# Patient Record
Sex: FEMALE | Race: WHITE | NOT HISPANIC OR LATINO | Employment: OTHER | URBAN - METROPOLITAN AREA
[De-identification: names, ages, dates, MRNs, and addresses within clinical notes are randomized per-mention and may not be internally consistent; named-entity substitution may affect disease eponyms.]

---

## 2017-03-13 ENCOUNTER — CONVERSION ENCOUNTER (OUTPATIENT)
Dept: MAMMOGRAPHY | Facility: HOSPITAL | Age: 67
End: 2017-03-13

## 2018-03-15 ENCOUNTER — CONVERSION ENCOUNTER (OUTPATIENT)
Dept: GENERAL RADIOLOGY | Facility: HOSPITAL | Age: 68
End: 2018-03-15

## 2018-03-30 ENCOUNTER — CONVERSION ENCOUNTER (OUTPATIENT)
Dept: GENERAL RADIOLOGY | Facility: HOSPITAL | Age: 68
End: 2018-03-30

## 2019-01-07 ENCOUNTER — HOSPITAL ENCOUNTER (OUTPATIENT)
Dept: LAB | Facility: HOSPITAL | Age: 69
Discharge: HOME | End: 2019-01-07
Attending: INTERNAL MEDICINE

## 2019-01-07 LAB
25(OH)D3 SERPL-MCNC: 31.2 NG/ML (ref 30–100)
ALBUMIN SERPL-MCNC: 4.2 G/DL (ref 3.5–5)
ALBUMIN/GLOB SERPL: 1.7 {RATIO} (ref 1.4–2.6)
ALP SERPL-CCNC: 79 U/L (ref 43–160)
ALT SERPL-CCNC: 27 U/L (ref 10–40)
ANION GAP SERPL CALC-SCNC: 16 MMOL/L (ref 8–19)
AST SERPL-CCNC: 19 U/L (ref 15–50)
BILIRUB SERPL-MCNC: 0.29 MG/DL (ref 0.2–1.3)
BUN SERPL-MCNC: 21 MG/DL (ref 5–25)
BUN/CREAT SERPL: 28 {RATIO} (ref 6–20)
CALCIUM SERPL-MCNC: 9.3 MG/DL (ref 8.7–10.4)
CHLORIDE SERPL-SCNC: 100 MMOL/L (ref 99–111)
CONV CO2: 28 MMOL/L (ref 22–32)
CONV TOTAL PROTEIN: 6.7 G/DL (ref 6.3–8.2)
CREAT UR-MCNC: 0.74 MG/DL (ref 0.5–0.9)
GFR SERPLBLD BASED ON 1.73 SQ M-ARVRAT: >60 ML/MIN/{1.73_M2}
GLOBULIN UR ELPH-MCNC: 2.5 G/DL (ref 2–3.5)
GLUCOSE SERPL-MCNC: 102 MG/DL (ref 65–99)
OSMOLALITY SERPL CALC.SUM OF ELEC: 293 MOSM/KG (ref 273–304)
POTASSIUM SERPL-SCNC: 4.4 MMOL/L (ref 3.5–5.3)
SODIUM SERPL-SCNC: 140 MMOL/L (ref 135–147)

## 2019-01-29 ENCOUNTER — HOSPITAL ENCOUNTER (OUTPATIENT)
Dept: URGENT CARE | Facility: CLINIC | Age: 69
Discharge: HOME | End: 2019-01-29
Attending: FAMILY MEDICINE

## 2019-03-18 ENCOUNTER — HOSPITAL ENCOUNTER (OUTPATIENT)
Dept: GENERAL RADIOLOGY | Facility: HOSPITAL | Age: 69
Discharge: HOME | End: 2019-03-18
Attending: INTERNAL MEDICINE

## 2019-10-14 ENCOUNTER — HOSPITAL ENCOUNTER (OUTPATIENT)
Dept: GENERAL RADIOLOGY | Facility: HOSPITAL | Age: 69
Discharge: HOME | End: 2019-10-14
Attending: INTERNAL MEDICINE

## 2019-10-14 LAB
ALBUMIN SERPL-MCNC: 4.4 G/DL (ref 3.5–5)
ALBUMIN/GLOB SERPL: 1.8 {RATIO} (ref 1.4–2.6)
ALP SERPL-CCNC: 84 U/L (ref 43–160)
ALT SERPL-CCNC: 27 U/L (ref 10–40)
ANION GAP SERPL CALC-SCNC: 17 MMOL/L (ref 8–19)
AST SERPL-CCNC: 25 U/L (ref 15–50)
BASOPHILS # BLD AUTO: 0.05 10*3/UL (ref 0–0.2)
BASOPHILS NFR BLD AUTO: 0.6 % (ref 0–3)
BILIRUB SERPL-MCNC: 0.34 MG/DL (ref 0.2–1.3)
BUN SERPL-MCNC: 17 MG/DL (ref 5–25)
BUN/CREAT SERPL: 22 {RATIO} (ref 6–20)
CALCIUM SERPL-MCNC: 9.5 MG/DL (ref 8.7–10.4)
CHLORIDE SERPL-SCNC: 103 MMOL/L (ref 99–111)
CHOLEST SERPL-MCNC: 190 MG/DL (ref 107–200)
CHOLEST/HDLC SERPL: 4 {RATIO} (ref 3–6)
CONV ABS IMM GRAN: 0.02 10*3/UL (ref 0–0.2)
CONV CO2: 26 MMOL/L (ref 22–32)
CONV IMMATURE GRAN: 0.3 % (ref 0–1.8)
CONV TOTAL PROTEIN: 6.9 G/DL (ref 6.3–8.2)
CREAT UR-MCNC: 0.79 MG/DL (ref 0.5–0.9)
DEPRECATED RDW RBC AUTO: 45.7 FL (ref 36.4–46.3)
EOSINOPHIL # BLD AUTO: 0.21 10*3/UL (ref 0–0.7)
EOSINOPHIL # BLD AUTO: 2.7 % (ref 0–7)
ERYTHROCYTE [DISTWIDTH] IN BLOOD BY AUTOMATED COUNT: 12.8 % (ref 11.7–14.4)
GFR SERPLBLD BASED ON 1.73 SQ M-ARVRAT: >60 ML/MIN/{1.73_M2}
GLOBULIN UR ELPH-MCNC: 2.5 G/DL (ref 2–3.5)
GLUCOSE SERPL-MCNC: 98 MG/DL (ref 65–99)
HCT VFR BLD AUTO: 45.1 % (ref 37–47)
HDLC SERPL-MCNC: 47 MG/DL (ref 40–60)
HGB BLD-MCNC: 14.6 G/DL (ref 12–16)
LDLC SERPL CALC-MCNC: 114 MG/DL (ref 70–100)
LYMPHOCYTES # BLD AUTO: 1.89 10*3/UL (ref 1–5)
LYMPHOCYTES NFR BLD AUTO: 24 % (ref 20–45)
MCH RBC QN AUTO: 31.1 PG (ref 27–31)
MCHC RBC AUTO-ENTMCNC: 32.4 G/DL (ref 33–37)
MCV RBC AUTO: 96 FL (ref 81–99)
MONOCYTES # BLD AUTO: 0.66 10*3/UL (ref 0.2–1.2)
MONOCYTES NFR BLD AUTO: 8.4 % (ref 3–10)
NEUTROPHILS # BLD AUTO: 5.05 10*3/UL (ref 2–8)
NEUTROPHILS NFR BLD AUTO: 64 % (ref 30–85)
NRBC CBCN: 0 % (ref 0–0.7)
OSMOLALITY SERPL CALC.SUM OF ELEC: 294 MOSM/KG (ref 273–304)
PLATELET # BLD AUTO: 268 10*3/UL (ref 130–400)
PMV BLD AUTO: 11.6 FL (ref 9.4–12.3)
POTASSIUM SERPL-SCNC: 4.7 MMOL/L (ref 3.5–5.3)
RBC # BLD AUTO: 4.7 10*6/UL (ref 4.2–5.4)
SODIUM SERPL-SCNC: 141 MMOL/L (ref 135–147)
TRIGL SERPL-MCNC: 145 MG/DL (ref 40–150)
TSH SERPL-ACNC: 2.23 M[IU]/L (ref 0.27–4.2)
VLDLC SERPL-MCNC: 29 MG/DL (ref 5–37)
WBC # BLD AUTO: 7.88 10*3/UL (ref 4.8–10.8)

## 2019-11-14 ENCOUNTER — HOSPITAL ENCOUNTER (OUTPATIENT)
Dept: GASTROENTEROLOGY | Facility: HOSPITAL | Age: 69
Setting detail: HOSPITAL OUTPATIENT SURGERY
Discharge: HOME | End: 2019-11-14
Attending: INTERNAL MEDICINE

## 2020-01-21 ENCOUNTER — OFFICE VISIT CONVERTED (OUTPATIENT)
Dept: GASTROENTEROLOGY | Facility: CLINIC | Age: 70
End: 2020-01-21
Attending: NURSE PRACTITIONER

## 2020-01-21 ENCOUNTER — CONVERSION ENCOUNTER (OUTPATIENT)
Dept: GASTROENTEROLOGY | Facility: CLINIC | Age: 70
End: 2020-01-21

## 2020-06-24 ENCOUNTER — HOSPITAL ENCOUNTER (OUTPATIENT)
Dept: GENERAL RADIOLOGY | Facility: HOSPITAL | Age: 70
Discharge: HOME | End: 2020-06-24
Attending: INTERNAL MEDICINE

## 2020-08-10 ENCOUNTER — HOSPITAL ENCOUNTER (OUTPATIENT)
Dept: LAB | Facility: HOSPITAL | Age: 70
Discharge: HOME | End: 2020-08-10
Attending: INTERNAL MEDICINE

## 2020-08-10 LAB
ALBUMIN SERPL-MCNC: 4.2 G/DL (ref 3.5–5)
ALBUMIN/GLOB SERPL: 1.7 {RATIO} (ref 1.4–2.6)
ALP SERPL-CCNC: 98 U/L (ref 43–160)
ALT SERPL-CCNC: 50 U/L (ref 10–40)
ANION GAP SERPL CALC-SCNC: 18 MMOL/L (ref 8–19)
AST SERPL-CCNC: 44 U/L (ref 15–50)
BASOPHILS # BLD AUTO: 0.05 10*3/UL (ref 0–0.2)
BASOPHILS NFR BLD AUTO: 0.7 % (ref 0–3)
BILIRUB SERPL-MCNC: 0.32 MG/DL (ref 0.2–1.3)
BUN SERPL-MCNC: 19 MG/DL (ref 5–25)
BUN/CREAT SERPL: 21 {RATIO} (ref 6–20)
CALCIUM SERPL-MCNC: 9.8 MG/DL (ref 8.7–10.4)
CHLORIDE SERPL-SCNC: 102 MMOL/L (ref 99–111)
CHOLEST SERPL-MCNC: 210 MG/DL (ref 107–200)
CHOLEST/HDLC SERPL: 5.1 {RATIO} (ref 3–6)
CONV ABS IMM GRAN: 0.03 10*3/UL (ref 0–0.2)
CONV CO2: 24 MMOL/L (ref 22–32)
CONV IMMATURE GRAN: 0.4 % (ref 0–1.8)
CONV TOTAL PROTEIN: 6.7 G/DL (ref 6.3–8.2)
CREAT UR-MCNC: 0.9 MG/DL (ref 0.5–0.9)
DEPRECATED RDW RBC AUTO: 45.5 FL (ref 36.4–46.3)
EOSINOPHIL # BLD AUTO: 0.2 10*3/UL (ref 0–0.7)
EOSINOPHIL # BLD AUTO: 2.9 % (ref 0–7)
ERYTHROCYTE [DISTWIDTH] IN BLOOD BY AUTOMATED COUNT: 13.1 % (ref 11.7–14.4)
EST. AVERAGE GLUCOSE BLD GHB EST-MCNC: 114 MG/DL
GFR SERPLBLD BASED ON 1.73 SQ M-ARVRAT: >60 ML/MIN/{1.73_M2}
GLOBULIN UR ELPH-MCNC: 2.5 G/DL (ref 2–3.5)
GLUCOSE SERPL-MCNC: 109 MG/DL (ref 65–99)
HBA1C MFR BLD: 5.6 % (ref 3.5–5.7)
HCT VFR BLD AUTO: 43.9 % (ref 37–47)
HDLC SERPL-MCNC: 41 MG/DL (ref 40–60)
HGB BLD-MCNC: 14.2 G/DL (ref 12–16)
LDLC SERPL CALC-MCNC: 145 MG/DL (ref 70–100)
LYMPHOCYTES # BLD AUTO: 1.61 10*3/UL (ref 1–5)
LYMPHOCYTES NFR BLD AUTO: 23.1 % (ref 20–45)
MCH RBC QN AUTO: 31 PG (ref 27–31)
MCHC RBC AUTO-ENTMCNC: 32.3 G/DL (ref 33–37)
MCV RBC AUTO: 95.9 FL (ref 81–99)
MONOCYTES # BLD AUTO: 0.71 10*3/UL (ref 0.2–1.2)
MONOCYTES NFR BLD AUTO: 10.2 % (ref 3–10)
NEUTROPHILS # BLD AUTO: 4.36 10*3/UL (ref 2–8)
NEUTROPHILS NFR BLD AUTO: 62.7 % (ref 30–85)
NRBC CBCN: 0 % (ref 0–0.7)
OSMOLALITY SERPL CALC.SUM OF ELEC: 293 MOSM/KG (ref 273–304)
PLATELET # BLD AUTO: 256 10*3/UL (ref 130–400)
PMV BLD AUTO: 11.8 FL (ref 9.4–12.3)
POTASSIUM SERPL-SCNC: 4.4 MMOL/L (ref 3.5–5.3)
RBC # BLD AUTO: 4.58 10*6/UL (ref 4.2–5.4)
SODIUM SERPL-SCNC: 140 MMOL/L (ref 135–147)
TRIGL SERPL-MCNC: 121 MG/DL (ref 40–150)
TSH SERPL-ACNC: 2.55 M[IU]/L (ref 0.27–4.2)
VLDLC SERPL-MCNC: 24 MG/DL (ref 5–37)
WBC # BLD AUTO: 6.96 10*3/UL (ref 4.8–10.8)

## 2021-05-07 ENCOUNTER — HOSPITAL ENCOUNTER (OUTPATIENT)
Dept: GENERAL RADIOLOGY | Facility: HOSPITAL | Age: 71
Discharge: HOME | End: 2021-05-07
Attending: INTERNAL MEDICINE

## 2021-05-07 LAB
ALBUMIN SERPL-MCNC: 4.3 G/DL (ref 3.5–5)
ALBUMIN/GLOB SERPL: 1.3 {RATIO} (ref 1.4–2.6)
ALP SERPL-CCNC: 112 U/L (ref 43–160)
ALT SERPL-CCNC: 47 U/L (ref 10–40)
AMPHETAMINES UR QL SCN: NEGATIVE
ANION GAP SERPL CALC-SCNC: 18 MMOL/L (ref 8–19)
AST SERPL-CCNC: 32 U/L (ref 15–50)
BARBITURATES UR QL SCN: NEGATIVE
BENZODIAZ UR QL SCN: NEGATIVE
BILIRUB SERPL-MCNC: 0.42 MG/DL (ref 0.2–1.3)
BUN SERPL-MCNC: 14 MG/DL (ref 5–25)
BUN/CREAT SERPL: 16 {RATIO} (ref 6–20)
CALCIUM SERPL-MCNC: 9.6 MG/DL (ref 8.7–10.4)
CHLORIDE SERPL-SCNC: 101 MMOL/L (ref 99–111)
CHOLEST SERPL-MCNC: 220 MG/DL (ref 107–200)
CHOLEST/HDLC SERPL: 4.7 {RATIO} (ref 3–6)
CONV CO2: 27 MMOL/L (ref 22–32)
CONV COCAINE, UR: NEGATIVE
CONV TOTAL PROTEIN: 7.7 G/DL (ref 6.3–8.2)
CREAT UR-MCNC: 0.88 MG/DL (ref 0.5–0.9)
EST. AVERAGE GLUCOSE BLD GHB EST-MCNC: 123 MG/DL
GFR SERPLBLD BASED ON 1.73 SQ M-ARVRAT: >60 ML/MIN/{1.73_M2}
GLOBULIN UR ELPH-MCNC: 3.4 G/DL (ref 2–3.5)
GLUCOSE SERPL-MCNC: 108 MG/DL (ref 65–99)
HBA1C MFR BLD: 5.9 % (ref 3.5–5.7)
HDLC SERPL-MCNC: 47 MG/DL (ref 40–60)
LDLC SERPL CALC-MCNC: 141 MG/DL (ref 70–100)
METHADONE UR QL SCN: NEGATIVE
OPIATES TESTED UR SCN: NEGATIVE
OSMOLALITY SERPL CALC.SUM OF ELEC: 295 MOSM/KG (ref 273–304)
OXYCODONE UR QL SCN: NEGATIVE
PCP UR QL: NEGATIVE
POTASSIUM SERPL-SCNC: 4.2 MMOL/L (ref 3.5–5.3)
SODIUM SERPL-SCNC: 142 MMOL/L (ref 135–147)
THC SERPLBLD CFM-MCNC: NEGATIVE NG/ML
TRIGL SERPL-MCNC: 161 MG/DL (ref 40–150)
VLDLC SERPL-MCNC: 32 MG/DL (ref 5–37)

## 2021-05-15 VITALS
DIASTOLIC BLOOD PRESSURE: 81 MMHG | HEIGHT: 64 IN | HEART RATE: 79 BPM | WEIGHT: 182.5 LBS | SYSTOLIC BLOOD PRESSURE: 122 MMHG | BODY MASS INDEX: 31.16 KG/M2

## 2021-05-22 ENCOUNTER — TRANSCRIBE ORDERS (OUTPATIENT)
Dept: ADMINISTRATIVE | Facility: HOSPITAL | Age: 71
End: 2021-05-22

## 2021-05-22 DIAGNOSIS — Z12.31 VISIT FOR SCREENING MAMMOGRAM: Primary | ICD-10-CM

## 2021-06-29 ENCOUNTER — HOSPITAL ENCOUNTER (OUTPATIENT)
Dept: MAMMOGRAPHY | Facility: HOSPITAL | Age: 71
Discharge: HOME | End: 2021-06-29
Admitting: INTERNAL MEDICINE

## 2021-06-29 DIAGNOSIS — Z12.31 VISIT FOR SCREENING MAMMOGRAM: ICD-10-CM

## 2021-06-29 PROCEDURE — 77067 SCR MAMMO BI INCL CAD: CPT

## 2021-06-29 PROCEDURE — 77063 BREAST TOMOSYNTHESIS BI: CPT

## 2021-06-29 PROCEDURE — 77063 BREAST TOMOSYNTHESIS BI: CPT | Performed by: RADIOLOGY

## 2021-06-29 PROCEDURE — 77067 SCR MAMMO BI INCL CAD: CPT | Performed by: RADIOLOGY

## 2021-07-15 RX ORDER — TRIAMTERENE AND HYDROCHLOROTHIAZIDE 37.5; 25 MG/1; MG/1
CAPSULE ORAL
Qty: 90 CAPSULE | Refills: 3 | Status: SHIPPED | OUTPATIENT
Start: 2021-07-15 | End: 2022-01-25 | Stop reason: SDUPTHER

## 2021-09-13 ENCOUNTER — OFFICE VISIT (OUTPATIENT)
Dept: PODIATRY | Facility: CLINIC | Age: 71
End: 2021-09-13

## 2021-09-13 VITALS
HEART RATE: 74 BPM | BODY MASS INDEX: 32.32 KG/M2 | OXYGEN SATURATION: 98 % | SYSTOLIC BLOOD PRESSURE: 143 MMHG | WEIGHT: 194 LBS | TEMPERATURE: 96.7 F | DIASTOLIC BLOOD PRESSURE: 71 MMHG | HEIGHT: 65 IN

## 2021-09-13 DIAGNOSIS — M72.2 PLANTAR FASCIITIS: ICD-10-CM

## 2021-09-13 DIAGNOSIS — M79.672 FOOT PAIN, LEFT: Primary | ICD-10-CM

## 2021-09-13 PROCEDURE — 73630 X-RAY EXAM OF FOOT: CPT | Performed by: PODIATRIST

## 2021-09-13 PROCEDURE — 99203 OFFICE O/P NEW LOW 30 MIN: CPT | Performed by: PODIATRIST

## 2021-09-13 RX ORDER — METHYLPREDNISOLONE 4 MG/1
TABLET ORAL
Qty: 21 TABLET | Refills: 0 | Status: SHIPPED | OUTPATIENT
Start: 2021-09-13 | End: 2021-10-17 | Stop reason: SDUPTHER

## 2021-09-13 RX ORDER — DICLOFENAC SODIUM 75 MG/1
75 TABLET, DELAYED RELEASE ORAL 2 TIMES DAILY
Qty: 60 TABLET | Refills: 1 | Status: SHIPPED | OUTPATIENT
Start: 2021-09-13 | End: 2021-10-13

## 2021-09-13 RX ORDER — TRIAMTERENE AND HYDROCHLOROTHIAZIDE 37.5; 25 MG/1; MG/1
CAPSULE ORAL
COMMUNITY
Start: 2018-06-28 | End: 2021-10-17 | Stop reason: SDUPTHER

## 2021-09-13 RX ORDER — CITALOPRAM 20 MG/1
TABLET ORAL
COMMUNITY
Start: 2018-06-28 | End: 2022-01-11

## 2021-09-13 RX ORDER — MELATONIN
COMMUNITY
Start: 2018-06-28

## 2021-09-13 RX ORDER — GABAPENTIN 100 MG/1
CAPSULE ORAL
COMMUNITY
Start: 2021-08-11 | End: 2021-09-20

## 2021-09-13 NOTE — PROGRESS NOTES
[facility] - PODIATRY    Today's Date: 09/13/21    Patient Name: Alison Mackenzie  MRN: [id number]  CSN: [id number]  PCP: Watson Johansen MD  Referring Provider: No ref. provider found    SUBJECTIVE     Chief Complaint   Patient presents with   • Left Foot - Pain     Heel pain which is much better since she scheduled this appointment     HPI: Alison Mackenzie, a 70 y.o.female, comes to clinic.    New, Established, New Problem: New    Location: Left heel    Duration: Early summer 2021    Onset:  gradual    Nature:  achy, sharp, shooting    Stable, worsening, improving: Slowly improving    Aggravating factors:      Patient describes morning left heel pain as stabbing, burning, or aching. This pain usually subsides throughout the day, however it returns after periods of rest and sitting, when standing back up   on their feet, and again the next morning.      Previous Treatment: Change in shoes    Patient denies any fevers, chills, nausea, vomiting, shortness of breathe, nor any other constitutional signs nor symptoms.    No other pedal complaints at this time.    Past Medical History:   Diagnosis Date   • Depression    • Middle ear effusion, bilateral      Past Surgical History:   Procedure Laterality Date   • COLONOSCOPY      15+ years ago   • CYSTOSCOPY     • HYSTERECTOMY       Family History   Problem Relation Age of Onset   • Kidney cancer Father 70     Social History     Socioeconomic History   • Marital status:      Spouse name: Not on file   • Number of children: Not on file   • Years of education: Not on file   • Highest education level: Not on file   Tobacco Use   • Smoking status: Never Smoker   • Smokeless tobacco: Never Used   Vaping Use   • Vaping Use: Never used   Substance and Sexual Activity   • Alcohol use: Never   • Drug use: Never   • Sexual activity: Defer     No Known Allergies  Current Outpatient Medications   Medication Sig Dispense Refill   •  cholecalciferol (Vitamin D) 25 MCG (1000 UT) tablet      • citalopram (CeleXA) 20 MG tablet Celexa 20 mg oral tablet take 1 tablet (20 mg) by oral route once daily   Active     • diclofenac (VOLTAREN) 75 MG EC tablet Take 1 tablet by mouth 2 (Two) Times a Day for 30 days. 60 tablet 1   • gabapentin (NEURONTIN) 100 MG capsule      • methylPREDNISolone (MEDROL) 4 MG dose pack Take as directed 21 tablet 0   • triamterene-hydrochlorothiazide (DYAZIDE) 37.5-25 MG per capsule TAKE 1 CAPSULE DAILY 90 capsule 3   • triamterene-hydrochlorothiazide (Dyazide) 37.5-25 MG per capsule        No current facility-administered medications for this visit.     Review of Systems   Constitutional: Negative.    Musculoskeletal:        Left heel pain   All other systems reviewed and are negative.      OBJECTIVE     Vitals:    09/13/21 0857   BP: 143/71   Pulse: 74   Temp: 96.7 °F (35.9 °C)   SpO2: 98%       PHYSICAL EXAM     Foot/Ankle Exam:       General:   Appearance: appears stated age and healthy    Orientation: AAOx3    Affect: appropriate    Gait: unimpaired      VASCULAR      Right Foot Vascularity   Normal vascular exam    Dorsalis pedis:  2+  Posterior tibial:  2+  Skin Temperature: warm    Edema Grading:  None  CFT:  < 3 seconds  Pedal Hair Growth:  Present  Varicosities: none       Left Foot Vascularity   Normal vascular exam    Dorsalis pedis:  2+  Posterior tibial:  2+  Skin Temperature: warm    Edema Grading:  None  CFT:  < 3 seconds  Pedal Hair Growth:  Present  Varicosities: none        NEUROLOGIC     Right Foot Neurologic   Normal sensation    Light touch sensation:  Normal  Vibratory sensation:  Normal  Hot/Cold sensation: normal       Left Foot Neurologic   Normal sensation    Light touch sensation:  Normal  Vibratory sensation:  Normal  Hot/cold sensation: normal    Protective Sensation using L[place]-Linda Monofilament:  10     MUSCULOSKELETAL      Left Foot Musculoskeletal   Tenderness: plantar fascia and plantar  heel       MUSCLE STRENGTH     Right Foot Muscle Strength   Normal strength    Foot dorsiflexion:  5  Foot plantar flexion:  5  Foot inversion:  5  Foot eversion:  5     Left Foot Muscle Strength   Foot dorsiflexion:  5  Foot plantar flexion:  5  Foot inversion:  5  Foot eversion:  5     RANGE OF MOTION      Right Foot Range of Motion   Foot and ankle ROM within normal limits       Left Foot Range of Motion   Foot and ankle ROM within normal limits       DERMATOLOGIC     Right Foot Dermatologic   Skin: skin intact    Nails: normal       Left Foot Dermatologic   Skin: skin intact    Nails: normal        RADIOLOGY:    XR Foot 3+ View Left    Result Date: 9/13/2021  Narrative: IN-OFFICE IMAGING:  3 view, AP, MO, Lateral, left foot Indication: Left heel pain Findings: Small posterior and inferior calcaneal enthesopathy.  No periosteal reactions nor osteolytic changes seen.  No occult fractures seen. Comparison: No comparison views available.        ASSESSMENT/PLAN     Diagnoses and all orders for this visit:    1. Foot pain, left (Primary)    2. Plantar fasciitis  -     diclofenac (VOLTAREN) 75 MG EC tablet; Take 1 tablet by mouth 2 (Two) Times a Day for 30 days.  Dispense: 60 tablet; Refill: 1  -     methylPREDNISolone (MEDROL) 4 MG dose pack; Take as directed  Dispense: 21 tablet; Refill: 0      Comprehensive lower extremity examination and evaluation was performed.    Discussed findings and treatment plan including risks, benefits, and treatment options with patient in detail. Patient agreed with treatment plan.    An After Visit Summary was printed and given to the patient at discharge, including (if requested) any available informative/educational handouts regarding diagnosis, treatment, or medications. All questions were answered to patient/family satisfaction. Should symptoms fail to improve or worsen they agree to call or return to clinic or to go to the Emergency Department. Discussed the importance of  following up with any needed screening tests/labs/specialist appointments and any requested follow-up recommended by me today. Importance of maintaining follow-up discussed and patient accepts that missed appointments can delay diagnosis and potentially lead to worsening of conditions.    Return in about 3 weeks (around 10/4/2021) for Rx f/u., or sooner if acute issues arise.    This document has been electronically signed by Jori Juarez DPM on September 13, 2021 11:49 EDT

## 2021-09-18 DIAGNOSIS — G62.9 PERIPHERAL POLYNEUROPATHY: Primary | ICD-10-CM

## 2021-09-20 RX ORDER — GABAPENTIN 100 MG/1
CAPSULE ORAL
Qty: 60 CAPSULE | Refills: 5 | Status: SHIPPED | OUTPATIENT
Start: 2021-09-20 | End: 2022-02-09 | Stop reason: SDUPTHER

## 2021-09-20 RX ORDER — GABAPENTIN 300 MG/1
CAPSULE ORAL
Qty: 30 CAPSULE | Refills: 5 | Status: SHIPPED | OUTPATIENT
Start: 2021-09-20 | End: 2022-02-09

## 2022-01-11 RX ORDER — CITALOPRAM 20 MG/1
TABLET ORAL
Qty: 90 TABLET | Refills: 3 | Status: SHIPPED | OUTPATIENT
Start: 2022-01-11 | End: 2022-01-25 | Stop reason: SDUPTHER

## 2022-01-25 RX ORDER — CITALOPRAM 20 MG/1
20 TABLET ORAL DAILY
Qty: 90 TABLET | Refills: 3 | Status: SHIPPED | OUTPATIENT
Start: 2022-01-25 | End: 2023-01-18

## 2022-01-25 RX ORDER — TRIAMTERENE AND HYDROCHLOROTHIAZIDE 37.5; 25 MG/1; MG/1
1 CAPSULE ORAL DAILY
Qty: 90 CAPSULE | Refills: 3 | Status: SHIPPED | OUTPATIENT
Start: 2022-01-25 | End: 2023-01-18

## 2022-01-25 NOTE — TELEPHONE ENCOUNTER
Caller: Alison Mackenzie    Relationship: Self    Best call back number: [phone]    Requested Prescriptions:   Requested Prescriptions     Pending Prescriptions Disp Refills   • triamterene-hydrochlorothiazide (DYAZIDE) 37.5-25 MG per capsule 90 capsule 3     Sig: Take 1 capsule by mouth Daily.   • citalopram (CeleXA) 20 MG tablet 90 tablet 3     Sig: Take 1 tablet by mouth Daily.        Pharmacy where request should be sent: [id number] [place] [facility] [address] [facility] [phone] [facility] [phone] FX     Additional details provided by patient:  PATIENT CALLED STATING THAT SHE HER INSURANCE HAS CHANGED FOR HER MEDICATIONS AND SHE IS NEEDING HER   gabapentin (NEURONTIN) 300 MG capsule    gabapentin (NEURONTIN) 100 MG capsule    TO BE SENT TO [facility]. THE PATIENT WOULD LIKE A CALL BACK TO LET HER KNOW THIS HAS BEEN SENT TO THE PHARMACY PLEASE ADVISE THANK YOU.     [facility]   [address] [place] [address] [place] [address] [phone]    Does the patient have less than a 3 day supply:  [x] Yes  [x] No    Fredi Van Rep   01/25/22 14:48 EST

## 2022-02-07 ENCOUNTER — TRANSCRIBE ORDERS (OUTPATIENT)
Dept: LAB | Facility: HOSPITAL | Age: 72
End: 2022-02-07

## 2022-02-07 ENCOUNTER — LAB (OUTPATIENT)
Dept: LAB | Facility: HOSPITAL | Age: 72
End: 2022-02-07

## 2022-02-07 DIAGNOSIS — Z79.899 ENCOUNTER FOR LONG-TERM (CURRENT) USE OF OTHER MEDICATIONS: ICD-10-CM

## 2022-02-07 DIAGNOSIS — E78.2 MIXED HYPERLIPIDEMIA: ICD-10-CM

## 2022-02-07 DIAGNOSIS — E55.9 AVITAMINOSIS D: ICD-10-CM

## 2022-02-07 DIAGNOSIS — R73.01 IMPAIRED FASTING GLUCOSE: Primary | ICD-10-CM

## 2022-02-07 DIAGNOSIS — R73.01 IMPAIRED FASTING GLUCOSE: ICD-10-CM

## 2022-02-07 LAB
25(OH)D3 SERPL-MCNC: 57.9 NG/ML
ALBUMIN SERPL-MCNC: 4 G/DL (ref 3.5–5.2)
ALBUMIN/GLOB SERPL: 1.5 G/DL
ALP SERPL-CCNC: 81 U/L (ref 39–117)
ALT SERPL W P-5'-P-CCNC: 27 U/L (ref 1–33)
AMPHET+METHAMPHET UR QL: NEGATIVE
ANION GAP SERPL CALCULATED.3IONS-SCNC: 8.4 MMOL/L (ref 5–15)
AST SERPL-CCNC: 20 U/L (ref 1–32)
BARBITURATES UR QL SCN: NEGATIVE
BASOPHILS # BLD AUTO: 0.06 10*3/MM3 (ref 0–0.2)
BASOPHILS NFR BLD AUTO: 0.7 % (ref 0–1.5)
BENZODIAZ UR QL SCN: NEGATIVE
BILIRUB SERPL-MCNC: 0.3 MG/DL (ref 0–1.2)
BUN SERPL-MCNC: 19 MG/DL (ref 8–23)
BUN/CREAT SERPL: 21.1 (ref 7–25)
CALCIUM SPEC-SCNC: 9.5 MG/DL (ref 8.6–10.5)
CANNABINOIDS SERPL QL: NEGATIVE
CHLORIDE SERPL-SCNC: 103 MMOL/L (ref 98–107)
CHOLEST SERPL-MCNC: 211 MG/DL (ref 0–200)
CO2 SERPL-SCNC: 28.6 MMOL/L (ref 22–29)
COCAINE UR QL: NEGATIVE
CREAT SERPL-MCNC: 0.9 MG/DL (ref 0.57–1)
DEPRECATED RDW RBC AUTO: 41.8 FL (ref 37–54)
EOSINOPHIL # BLD AUTO: 0.24 10*3/MM3 (ref 0–0.4)
EOSINOPHIL NFR BLD AUTO: 2.6 % (ref 0.3–6.2)
ERYTHROCYTE [DISTWIDTH] IN BLOOD BY AUTOMATED COUNT: 12.3 % (ref 12.3–15.4)
GFR SERPL CREATININE-BSD FRML MDRD: 62 ML/MIN/1.73
GLOBULIN UR ELPH-MCNC: 2.6 GM/DL
GLUCOSE SERPL-MCNC: 93 MG/DL (ref 65–99)
HBA1C MFR BLD: 6.39 % (ref 4.8–5.6)
HCT VFR BLD AUTO: 45.5 % (ref 34–46.6)
HDLC SERPL-MCNC: 46 MG/DL (ref 40–60)
HGB BLD-MCNC: 15.1 G/DL (ref 12–15.9)
IMM GRANULOCYTES # BLD AUTO: 0.04 10*3/MM3 (ref 0–0.05)
IMM GRANULOCYTES NFR BLD AUTO: 0.4 % (ref 0–0.5)
LDLC SERPL CALC-MCNC: 138 MG/DL (ref 0–100)
LDLC/HDLC SERPL: 2.92 {RATIO}
LYMPHOCYTES # BLD AUTO: 2.14 10*3/MM3 (ref 0.7–3.1)
LYMPHOCYTES NFR BLD AUTO: 23.3 % (ref 19.6–45.3)
MCH RBC QN AUTO: 30.9 PG (ref 26.6–33)
MCHC RBC AUTO-ENTMCNC: 33.2 G/DL (ref 31.5–35.7)
MCV RBC AUTO: 93.2 FL (ref 79–97)
METHADONE UR QL SCN: NEGATIVE
MONOCYTES # BLD AUTO: 0.81 10*3/MM3 (ref 0.1–0.9)
MONOCYTES NFR BLD AUTO: 8.8 % (ref 5–12)
NEUTROPHILS NFR BLD AUTO: 5.89 10*3/MM3 (ref 1.7–7)
NEUTROPHILS NFR BLD AUTO: 64.2 % (ref 42.7–76)
NRBC BLD AUTO-RTO: 0 /100 WBC (ref 0–0.2)
OPIATES UR QL: NEGATIVE
OXYCODONE UR QL SCN: NEGATIVE
PLATELET # BLD AUTO: 292 10*3/MM3 (ref 140–450)
PMV BLD AUTO: 11.3 FL (ref 6–12)
POTASSIUM SERPL-SCNC: 4.4 MMOL/L (ref 3.5–5.2)
PROT SERPL-MCNC: 6.6 G/DL (ref 6–8.5)
RBC # BLD AUTO: 4.88 10*6/MM3 (ref 3.77–5.28)
SODIUM SERPL-SCNC: 140 MMOL/L (ref 136–145)
TRIGL SERPL-MCNC: 153 MG/DL (ref 0–150)
VLDLC SERPL-MCNC: 27 MG/DL (ref 5–40)
WBC NRBC COR # BLD: 9.18 10*3/MM3 (ref 3.4–10.8)

## 2022-02-07 PROCEDURE — 36415 COLL VENOUS BLD VENIPUNCTURE: CPT

## 2022-02-07 PROCEDURE — 80307 DRUG TEST PRSMV CHEM ANLYZR: CPT

## 2022-02-07 PROCEDURE — 80061 LIPID PANEL: CPT

## 2022-02-07 PROCEDURE — 85025 COMPLETE CBC W/AUTO DIFF WBC: CPT

## 2022-02-07 PROCEDURE — 80053 COMPREHEN METABOLIC PANEL: CPT

## 2022-02-07 PROCEDURE — 82306 VITAMIN D 25 HYDROXY: CPT

## 2022-02-07 PROCEDURE — 83036 HEMOGLOBIN GLYCOSYLATED A1C: CPT

## 2022-02-09 ENCOUNTER — OFFICE VISIT (OUTPATIENT)
Dept: INTERNAL MEDICINE | Facility: CLINIC | Age: 72
End: 2022-02-09

## 2022-02-09 VITALS
TEMPERATURE: 97.7 F | DIASTOLIC BLOOD PRESSURE: 79 MMHG | WEIGHT: 190.4 LBS | HEART RATE: 86 BPM | HEIGHT: 65 IN | SYSTOLIC BLOOD PRESSURE: 124 MMHG | BODY MASS INDEX: 31.72 KG/M2 | OXYGEN SATURATION: 94 %

## 2022-02-09 DIAGNOSIS — E78.2 MIXED HYPERLIPIDEMIA: ICD-10-CM

## 2022-02-09 DIAGNOSIS — Z99.89 OSA ON CPAP: ICD-10-CM

## 2022-02-09 DIAGNOSIS — E55.9 VITAMIN D DEFICIENCY: ICD-10-CM

## 2022-02-09 DIAGNOSIS — H81.01 MENIERE DISEASE, RIGHT: ICD-10-CM

## 2022-02-09 DIAGNOSIS — R74.8 ELEVATED LIVER ENZYMES: ICD-10-CM

## 2022-02-09 DIAGNOSIS — G62.9 PERIPHERAL POLYNEUROPATHY: ICD-10-CM

## 2022-02-09 DIAGNOSIS — G47.33 OSA ON CPAP: ICD-10-CM

## 2022-02-09 DIAGNOSIS — R73.01 IFG (IMPAIRED FASTING GLUCOSE): Primary | ICD-10-CM

## 2022-02-09 DIAGNOSIS — M81.0 OSTEOPOROSIS, UNSPECIFIED OSTEOPOROSIS TYPE, UNSPECIFIED PATHOLOGICAL FRACTURE PRESENCE: ICD-10-CM

## 2022-02-09 DIAGNOSIS — H93.13 TINNITUS OF BOTH EARS: ICD-10-CM

## 2022-02-09 PROBLEM — G47.30 SLEEP APNEA: Status: ACTIVE | Noted: 2022-02-09

## 2022-02-09 PROBLEM — H65.93 MIDDLE EAR EFFUSION, BILATERAL: Status: ACTIVE | Noted: 2022-02-09

## 2022-02-09 PROCEDURE — 99214 OFFICE O/P EST MOD 30 MIN: CPT | Performed by: INTERNAL MEDICINE

## 2022-02-09 RX ORDER — GABAPENTIN 100 MG/1
100 CAPSULE ORAL 2 TIMES DAILY
Qty: 180 CAPSULE | Refills: 3 | Status: SHIPPED | OUTPATIENT
Start: 2022-02-09 | End: 2022-08-22

## 2022-02-09 RX ORDER — GABAPENTIN 300 MG/1
300 CAPSULE ORAL
Qty: 90 CAPSULE | Refills: 3 | Status: SHIPPED | OUTPATIENT
Start: 2022-02-09 | End: 2022-08-22

## 2022-02-09 NOTE — PROGRESS NOTES
"Chief Complaint/ HPI: fatiigue  No covid   No falls   No cp no soa    Objective   Vital Signs  Vitals:    02/09/22 1150   BP: 124/79   BP Location: Left arm   Patient Position: Sitting   Cuff Size: Large Adult   Pulse: 86   Temp: 97.7 °F (36.5 °C)   SpO2: 94%   Weight: 86.4 kg (190 lb 6.4 oz)   Height: 165.1 cm (65\")      Body mass index is 31.68 kg/m².  Review of Systems   Constitutional: Negative.    HENT: Negative.    Eyes: Negative.    Respiratory: Negative.    Cardiovascular: Negative.    Gastrointestinal: Negative.    Endocrine: Negative.    Genitourinary: Negative.    Musculoskeletal: Negative.    Allergic/Immunologic: Negative.    Neurological: Negative.    Hematological: Negative.    Psychiatric/Behavioral: Negative.       Physical Exam  Constitutional:       General: She is not in acute distress.     Appearance: Normal appearance.   HENT:      Head: Normocephalic.      Right Ear: Tympanic membrane and ear canal normal.      Left Ear: Tympanic membrane and ear canal normal.      Mouth/Throat:      Mouth: Mucous membranes are moist.   Eyes:      Conjunctiva/sclera: Conjunctivae normal.      Pupils: Pupils are equal, round, and reactive to light.   Cardiovascular:      Rate and Rhythm: Normal rate and regular rhythm.      Pulses: Normal pulses.      Heart sounds: Normal heart sounds.   Pulmonary:      Effort: Pulmonary effort is normal.      Breath sounds: Normal breath sounds.   Abdominal:      General: Abdomen is flat. Bowel sounds are normal.      Palpations: Abdomen is soft.   Musculoskeletal:         General: No swelling. Normal range of motion.      Cervical back: Neck supple.   Skin:     General: Skin is warm and dry.      Coloration: Skin is not jaundiced.   Neurological:      General: No focal deficit present.      Mental Status: She is alert and oriented to person, place, and time. Mental status is at baseline.   Psychiatric:         Mood and Affect: Mood normal.         Behavior: Behavior normal.   "       Thought Content: Thought content normal.         Judgment: Judgment normal.        Result Review :   No results found for: PROBNP, BNP  CMP    CMP 5/7/21 2/7/22   Glucose 108 (A) 93   BUN 14 19   Creatinine 0.88 0.90   eGFR Non African Am  62   Sodium 142 140   Potassium 4.2 4.4   Chloride 101 103   Calcium 9.6 9.5   Albumin 4.3 4.00   Total Bilirubin 0.42 0.3   Alkaline Phosphatase 112 81   AST (SGOT) 32 20   ALT (SGPT) 47 (A) 27   (A) Abnormal value            CBC w/diff    CBC w/Diff 2/7/22   WBC 9.18   RBC 4.88   Hemoglobin 15.1   Hematocrit 45.5   MCV 93.2   MCH 30.9   MCHC 33.2   RDW 12.3   Platelets 292   Neutrophil Rel % 64.2   Immature Granulocyte Rel % 0.4   Lymphocyte Rel % 23.3   Monocyte Rel % 8.8   Eosinophil Rel % 2.6   Basophil Rel % 0.7            Lipid Panel    Lipid Panel 5/7/21 2/7/22   Total Cholesterol  211 (A)   Total Cholesterol 220 (A)    Triglycerides 161 (A) 153 (A)   HDL Cholesterol 47 46   VLDL Cholesterol 32 27   LDL Cholesterol  141 (A) 138 (A)   LDL/HDL Ratio  2.92   (A) Abnormal value       Comments are available for some flowsheets but are not being displayed.            Lab Results   Component Value Date    TSH 2.550 08/10/2020    TSH 2.230 10/14/2019      No results found for: FREET4   A1C Last 3 Results    HGBA1C Last 3 Results 5/7/21 2/7/22   Hemoglobin A1C 5.9 (A) 6.39 (A)   (A) Abnormal value       Comments are available for some flowsheets but are not being displayed.                             Visit Diagnoses:    ICD-10-CM ICD-9-CM   1. IFG (impaired fasting glucose)  R73.01 790.21   2. Mixed hyperlipidemia  E78.2 272.2   3. Tinnitus of both ears  H93.13 388.30   4. Vitamin D deficiency  E55.9 268.9   5. Elevated liver enzymes  R74.8 790.5   6. Meniere disease, right  H81.01 386.00   7. INDIRA on CPAP  G47.33 327.23    Z99.89 V46.8   8. Osteoporosis, unspecified osteoporosis type, unspecified pathological fracture presence  M81.0 733.00       Assessment and Plan    Diagnoses and all orders for this visit:    1. IFG (impaired fasting glucose) (Primary)  -     Hemoglobin A1c; Future  -     Lipid Panel; Future  -     Comprehensive Metabolic Panel; Future  -     DEXA Bone Density Axial; Future    2. Mixed hyperlipidemia  -     Hemoglobin A1c; Future  -     Lipid Panel; Future  -     Comprehensive Metabolic Panel; Future  -     DEXA Bone Density Axial; Future    3. Tinnitus of both ears  -     Hemoglobin A1c; Future  -     Lipid Panel; Future  -     Comprehensive Metabolic Panel; Future  -     DEXA Bone Density Axial; Future    4. Vitamin D deficiency  -     Hemoglobin A1c; Future  -     Lipid Panel; Future  -     Comprehensive Metabolic Panel; Future  -     DEXA Bone Density Axial; Future    5. Elevated liver enzymes  -     Hemoglobin A1c; Future  -     Lipid Panel; Future  -     Comprehensive Metabolic Panel; Future  -     DEXA Bone Density Axial; Future    6. Meniere disease, right  -     Hemoglobin A1c; Future  -     Lipid Panel; Future  -     Comprehensive Metabolic Panel; Future  -     DEXA Bone Density Axial; Future    7. INDIRA on CPAP  -     Hemoglobin A1c; Future  -     Lipid Panel; Future  -     Comprehensive Metabolic Panel; Future  -     DEXA Bone Density Axial; Future    8. Osteoporosis, unspecified osteoporosis type, unspecified pathological fracture presence  -     Hemoglobin A1c; Future  -     Lipid Panel; Future  -     Comprehensive Metabolic Panel; Future  -     DEXA Bone Density Axial; Future        Mixed hyperlipidemia mild, treatment options,    Impaired fasting glucose hemoglobin A1c up to 6.3, diagnosis discussed,    Obstructive sleep apnea continues on CPAP machine, previous sleep study November 21, 2019 tolerating well,    Ménière syndrome, continues Dyazide,--continues on gabapentin 300 nightly 100 twice daily, discussion about stopping tapering discussed February 2022    History of elevated liver enzymes in the past, previous work-up March 2018 all  negative ultrasound the liver showed some fatty replacement    Vitamin D deficiency previously    Colonoscopy polyps present  Began in November 2019 follow-up again in 2022      Follow Up   No follow-ups on file.  Patient was given instructions and counseling regarding her condition or for health maintenance advice. Please see specific information pulled into the AVS if appropriate.

## 2022-02-10 ENCOUNTER — TRANSCRIBE ORDERS (OUTPATIENT)
Dept: ADMINISTRATIVE | Facility: HOSPITAL | Age: 72
End: 2022-02-10

## 2022-02-10 DIAGNOSIS — Z12.31 OTHER SCREENING MAMMOGRAM: Primary | ICD-10-CM

## 2022-08-01 ENCOUNTER — HOSPITAL ENCOUNTER (OUTPATIENT)
Dept: BONE DENSITY | Facility: HOSPITAL | Age: 72
Discharge: HOME | End: 2022-08-01

## 2022-08-01 ENCOUNTER — LAB (OUTPATIENT)
Dept: LAB | Facility: HOSPITAL | Age: 72
End: 2022-08-01

## 2022-08-01 ENCOUNTER — TELEPHONE (OUTPATIENT)
Dept: INTERNAL MEDICINE | Facility: CLINIC | Age: 72
End: 2022-08-01

## 2022-08-01 ENCOUNTER — HOSPITAL ENCOUNTER (OUTPATIENT)
Dept: MAMMOGRAPHY | Facility: HOSPITAL | Age: 72
Discharge: HOME | End: 2022-08-01

## 2022-08-01 DIAGNOSIS — H81.01 MENIERE DISEASE, RIGHT: ICD-10-CM

## 2022-08-01 DIAGNOSIS — M81.0 OSTEOPOROSIS, UNSPECIFIED OSTEOPOROSIS TYPE, UNSPECIFIED PATHOLOGICAL FRACTURE PRESENCE: ICD-10-CM

## 2022-08-01 DIAGNOSIS — R73.01 IFG (IMPAIRED FASTING GLUCOSE): ICD-10-CM

## 2022-08-01 DIAGNOSIS — Z12.31 OTHER SCREENING MAMMOGRAM: ICD-10-CM

## 2022-08-01 DIAGNOSIS — Z99.89 OSA ON CPAP: ICD-10-CM

## 2022-08-01 DIAGNOSIS — G47.33 OSA ON CPAP: ICD-10-CM

## 2022-08-01 DIAGNOSIS — R74.8 ELEVATED LIVER ENZYMES: ICD-10-CM

## 2022-08-01 DIAGNOSIS — R92.8 ABNORMAL MAMMOGRAM: Primary | ICD-10-CM

## 2022-08-01 DIAGNOSIS — E55.9 VITAMIN D DEFICIENCY: ICD-10-CM

## 2022-08-01 DIAGNOSIS — H93.13 TINNITUS OF BOTH EARS: ICD-10-CM

## 2022-08-01 DIAGNOSIS — E78.2 MIXED HYPERLIPIDEMIA: ICD-10-CM

## 2022-08-01 LAB
ALBUMIN SERPL-MCNC: 3.9 G/DL (ref 3.5–5.2)
ALBUMIN/GLOB SERPL: 1.9 G/DL
ALP SERPL-CCNC: 77 U/L (ref 39–117)
ALT SERPL W P-5'-P-CCNC: 28 U/L (ref 1–33)
ANION GAP SERPL CALCULATED.3IONS-SCNC: 9.7 MMOL/L (ref 5–15)
AST SERPL-CCNC: 24 U/L (ref 1–32)
BILIRUB SERPL-MCNC: 0.3 MG/DL (ref 0–1.2)
BUN SERPL-MCNC: 15 MG/DL (ref 8–23)
BUN/CREAT SERPL: 18.5 (ref 7–25)
CALCIUM SPEC-SCNC: 9 MG/DL (ref 8.6–10.5)
CHLORIDE SERPL-SCNC: 108 MMOL/L (ref 98–107)
CHOLEST SERPL-MCNC: 201 MG/DL (ref 0–200)
CO2 SERPL-SCNC: 27.3 MMOL/L (ref 22–29)
CREAT SERPL-MCNC: 0.81 MG/DL (ref 0.57–1)
EGFRCR SERPLBLD CKD-EPI 2021: 78.2 ML/MIN/1.73
GLOBULIN UR ELPH-MCNC: 2.1 GM/DL
GLUCOSE SERPL-MCNC: 99 MG/DL (ref 65–99)
HBA1C MFR BLD: 5.9 % (ref 4.8–5.6)
HDLC SERPL-MCNC: 39 MG/DL (ref 40–60)
LDLC SERPL CALC-MCNC: 134 MG/DL (ref 0–100)
LDLC/HDLC SERPL: 3.37 {RATIO}
POTASSIUM SERPL-SCNC: 4.3 MMOL/L (ref 3.5–5.2)
PROT SERPL-MCNC: 6 G/DL (ref 6–8.5)
SODIUM SERPL-SCNC: 145 MMOL/L (ref 136–145)
TRIGL SERPL-MCNC: 153 MG/DL (ref 0–150)
VLDLC SERPL-MCNC: 28 MG/DL (ref 5–40)

## 2022-08-01 PROCEDURE — 83036 HEMOGLOBIN GLYCOSYLATED A1C: CPT

## 2022-08-01 PROCEDURE — 77063 BREAST TOMOSYNTHESIS BI: CPT

## 2022-08-01 PROCEDURE — 77080 DXA BONE DENSITY AXIAL: CPT

## 2022-08-01 PROCEDURE — 36415 COLL VENOUS BLD VENIPUNCTURE: CPT

## 2022-08-01 PROCEDURE — 77067 SCR MAMMO BI INCL CAD: CPT

## 2022-08-01 PROCEDURE — 80053 COMPREHEN METABOLIC PANEL: CPT

## 2022-08-01 PROCEDURE — 80061 LIPID PANEL: CPT

## 2022-08-01 NOTE — TELEPHONE ENCOUNTER
I called patient with abnormal mammogram will need diagnostic left and ultrasound of left breast to follow-up,, we sent the orders in today and I spoke with the patient on the phone, she actually has an appoint with me next week anyway

## 2022-08-09 ENCOUNTER — OFFICE VISIT (OUTPATIENT)
Dept: INTERNAL MEDICINE | Facility: CLINIC | Age: 72
End: 2022-08-09

## 2022-08-09 VITALS
DIASTOLIC BLOOD PRESSURE: 67 MMHG | HEIGHT: 65 IN | BODY MASS INDEX: 32.49 KG/M2 | WEIGHT: 195 LBS | OXYGEN SATURATION: 92 % | HEART RATE: 86 BPM | SYSTOLIC BLOOD PRESSURE: 121 MMHG | TEMPERATURE: 97.2 F

## 2022-08-09 DIAGNOSIS — R74.8 ELEVATED LIVER ENZYMES: ICD-10-CM

## 2022-08-09 DIAGNOSIS — G62.9 PERIPHERAL POLYNEUROPATHY: ICD-10-CM

## 2022-08-09 DIAGNOSIS — E55.9 VITAMIN D DEFICIENCY: ICD-10-CM

## 2022-08-09 DIAGNOSIS — E78.2 MIXED HYPERLIPIDEMIA: Primary | ICD-10-CM

## 2022-08-09 DIAGNOSIS — R73.01 IFG (IMPAIRED FASTING GLUCOSE): ICD-10-CM

## 2022-08-09 DIAGNOSIS — H81.01 MENIERE DISEASE, RIGHT: ICD-10-CM

## 2022-08-09 DIAGNOSIS — M54.50 BACK PAIN AT L4-L5 LEVEL: ICD-10-CM

## 2022-08-09 DIAGNOSIS — M81.0 OSTEOPOROSIS, UNSPECIFIED OSTEOPOROSIS TYPE, UNSPECIFIED PATHOLOGICAL FRACTURE PRESENCE: ICD-10-CM

## 2022-08-09 DIAGNOSIS — G47.33 OSA ON CPAP: ICD-10-CM

## 2022-08-09 DIAGNOSIS — Z99.89 OSA ON CPAP: ICD-10-CM

## 2022-08-09 DIAGNOSIS — R92.8 ABNORMAL MAMMOGRAM: ICD-10-CM

## 2022-08-09 PROCEDURE — 99214 OFFICE O/P EST MOD 30 MIN: CPT | Performed by: INTERNAL MEDICINE

## 2022-08-09 RX ORDER — SEMAGLUTIDE 1.34 MG/ML
0.25 INJECTION, SOLUTION SUBCUTANEOUS WEEKLY
Qty: 4 PEN | Refills: 5 | Status: SHIPPED | OUTPATIENT
Start: 2022-08-09 | End: 2022-11-10

## 2022-08-09 NOTE — PROGRESS NOTES
"Answers for HPI/ROS submitted by the patient on 8/2/2022  What is the primary reason for your visit?: Physical    Chief Complaint/ HPI: Patient is here to follow-up with weight issues concerns about that she is also had some back pain to follow-up with recent lab studies,          Objective   Vital Signs  Vitals:    08/09/22 1305   BP: 121/67   Pulse: 86   Temp: 97.2 °F (36.2 °C)   SpO2: 92%   Weight: 88.5 kg (195 lb)   Height: 165.1 cm (65\")      Body mass index is 32.45 kg/m².  Review of Systems   Constitutional: Negative.    HENT: Negative.    Eyes: Negative.    Respiratory: Negative.    Cardiovascular: Negative.    Gastrointestinal: Negative.    Endocrine: Negative.    Genitourinary: Negative.    Musculoskeletal: Positive for back pain.   Allergic/Immunologic: Negative.    Neurological: Negative.    Hematological: Negative.    Psychiatric/Behavioral: Negative.       Physical Exam  Constitutional:       General: She is not in acute distress.     Appearance: Normal appearance.   HENT:      Head: Normocephalic.      Mouth/Throat:      Mouth: Mucous membranes are moist.   Eyes:      Conjunctiva/sclera: Conjunctivae normal.      Pupils: Pupils are equal, round, and reactive to light.   Cardiovascular:      Rate and Rhythm: Normal rate and regular rhythm.      Pulses: Normal pulses.      Heart sounds: Normal heart sounds.   Pulmonary:      Effort: Pulmonary effort is normal.      Breath sounds: Normal breath sounds.   Abdominal:      General: Abdomen is flat. Bowel sounds are normal.      Palpations: Abdomen is soft.   Musculoskeletal:         General: Tenderness (Right low back focal pain to palpation,) present. No swelling. Normal range of motion.      Cervical back: Neck supple.   Skin:     General: Skin is warm and dry.      Coloration: Skin is not jaundiced.   Neurological:      General: No focal deficit present.      Mental Status: She is alert and oriented to person, place, and time. Mental status is at " baseline.   Psychiatric:         Mood and Affect: Mood normal.         Behavior: Behavior normal.         Thought Content: Thought content normal.         Judgment: Judgment normal.        Result Review :   No results found for: PROBNP, BNP  CMP    CMP 2/7/22 8/1/22   Glucose 93 99   BUN 19 15   Creatinine 0.90 0.81   eGFR Non African Am 62    Sodium 140 145   Potassium 4.4 4.3   Chloride 103 108 (A)   Calcium 9.5 9.0   Albumin 4.00 3.90   Total Bilirubin 0.3 0.3   Alkaline Phosphatase 81 77   AST (SGOT) 20 24   ALT (SGPT) 27 28   (A) Abnormal value            CBC w/diff    CBC w/Diff 2/7/22   WBC 9.18   RBC 4.88   Hemoglobin 15.1   Hematocrit 45.5   MCV 93.2   MCH 30.9   MCHC 33.2   RDW 12.3   Platelets 292   Neutrophil Rel % 64.2   Immature Granulocyte Rel % 0.4   Lymphocyte Rel % 23.3   Monocyte Rel % 8.8   Eosinophil Rel % 2.6   Basophil Rel % 0.7            Lipid Panel    Lipid Panel 2/7/22 8/1/22   Total Cholesterol 211 (A) 201 (A)   Triglycerides 153 (A) 153 (A)   HDL Cholesterol 46 39 (A)   VLDL Cholesterol 27 28   LDL Cholesterol  138 (A) 134 (A)   LDL/HDL Ratio 2.92 3.37   (A) Abnormal value             Lab Results   Component Value Date    TSH 2.550 08/10/2020    TSH 2.230 10/14/2019      No results found for: FREET4   A1C Last 3 Results    HGBA1C Last 3 Results 2/7/22 8/1/22   Hemoglobin A1C 6.39 (A) 5.90 (A)   (A) Abnormal value                              Visit Diagnoses:    ICD-10-CM ICD-9-CM   1. Mixed hyperlipidemia  E78.2 272.2   2. INDIRA on CPAP  G47.33 327.23    Z99.89 V46.8   3. Elevated liver enzymes  R74.8 790.5   4. Peripheral polyneuropathy  G62.9 356.9   5. Abnormal mammogram  R92.8 793.80   6. IFG (impaired fasting glucose)  R73.01 790.21   7. Vitamin D deficiency  E55.9 268.9   8. Osteoporosis, unspecified osteoporosis type, unspecified pathological fracture presence  M81.0 733.00   9. Meniere disease, right  H81.01 386.00   10. Back pain at L4-L5 level  M54.50 724.2       Assessment and  Plan   Diagnoses and all orders for this visit:    1. Mixed hyperlipidemia (Primary)  -     Comprehensive Metabolic Panel; Future  -     Hemoglobin A1c; Future  -     XR Spine Lumbar 2 or 3 View; Future  -     XR Pelvis 1 or 2 View; Future  -     Comprehensive Metabolic Panel; Future  -     CBC & Differential; Future  -     Hemoglobin A1c; Future  -     Lipid Panel; Future  -     Vitamin D 25 Hydroxy; Future    2. INDIRA on CPAP  -     Comprehensive Metabolic Panel; Future  -     Hemoglobin A1c; Future  -     XR Spine Lumbar 2 or 3 View; Future  -     XR Pelvis 1 or 2 View; Future  -     Comprehensive Metabolic Panel; Future  -     CBC & Differential; Future  -     Hemoglobin A1c; Future  -     Lipid Panel; Future  -     Vitamin D 25 Hydroxy; Future    3. Elevated liver enzymes  -     Comprehensive Metabolic Panel; Future  -     Hemoglobin A1c; Future  -     XR Spine Lumbar 2 or 3 View; Future  -     XR Pelvis 1 or 2 View; Future  -     Comprehensive Metabolic Panel; Future  -     CBC & Differential; Future  -     Hemoglobin A1c; Future  -     Lipid Panel; Future  -     Vitamin D 25 Hydroxy; Future    4. Peripheral polyneuropathy  -     Comprehensive Metabolic Panel; Future  -     Hemoglobin A1c; Future  -     XR Spine Lumbar 2 or 3 View; Future  -     XR Pelvis 1 or 2 View; Future  -     Comprehensive Metabolic Panel; Future  -     CBC & Differential; Future  -     Hemoglobin A1c; Future  -     Lipid Panel; Future  -     Vitamin D 25 Hydroxy; Future    5. Abnormal mammogram  -     Comprehensive Metabolic Panel; Future  -     Hemoglobin A1c; Future  -     XR Spine Lumbar 2 or 3 View; Future  -     XR Pelvis 1 or 2 View; Future  -     Comprehensive Metabolic Panel; Future  -     CBC & Differential; Future  -     Hemoglobin A1c; Future  -     Lipid Panel; Future  -     Vitamin D 25 Hydroxy; Future    6. IFG (impaired fasting glucose)  -     Comprehensive Metabolic Panel; Future  -     Hemoglobin A1c; Future  -     XR  Spine Lumbar 2 or 3 View; Future  -     XR Pelvis 1 or 2 View; Future  -     Comprehensive Metabolic Panel; Future  -     CBC & Differential; Future  -     Hemoglobin A1c; Future  -     Lipid Panel; Future  -     Vitamin D 25 Hydroxy; Future    7. Vitamin D deficiency  -     Comprehensive Metabolic Panel; Future  -     Hemoglobin A1c; Future  -     XR Spine Lumbar 2 or 3 View; Future  -     XR Pelvis 1 or 2 View; Future  -     Comprehensive Metabolic Panel; Future  -     CBC & Differential; Future  -     Hemoglobin A1c; Future  -     Lipid Panel; Future  -     Vitamin D 25 Hydroxy; Future    8. Osteoporosis, unspecified osteoporosis type, unspecified pathological fracture presence  -     Comprehensive Metabolic Panel; Future  -     Hemoglobin A1c; Future  -     XR Spine Lumbar 2 or 3 View; Future  -     XR Pelvis 1 or 2 View; Future  -     Comprehensive Metabolic Panel; Future  -     CBC & Differential; Future  -     Hemoglobin A1c; Future  -     Lipid Panel; Future  -     Vitamin D 25 Hydroxy; Future    9. Meniere disease, right  -     Comprehensive Metabolic Panel; Future  -     Hemoglobin A1c; Future  -     XR Spine Lumbar 2 or 3 View; Future  -     XR Pelvis 1 or 2 View; Future  -     Comprehensive Metabolic Panel; Future  -     CBC & Differential; Future  -     Hemoglobin A1c; Future  -     Lipid Panel; Future  -     Vitamin D 25 Hydroxy; Future    10. Back pain at L4-L5 level  -     Comprehensive Metabolic Panel; Future  -     Hemoglobin A1c; Future  -     XR Spine Lumbar 2 or 3 View; Future  -     XR Pelvis 1 or 2 View; Future  -     Comprehensive Metabolic Panel; Future  -     CBC & Differential; Future  -     Hemoglobin A1c; Future  -     Lipid Panel; Future  -     Vitamin D 25 Hydroxy; Future    Other orders  -     Semaglutide,0.25 or 0.5MG/DOS, (Ozempic, 0.25 or 0.5 MG/DOSE,) 2 MG/1.5ML solution pen-injector; Inject 0.25 mg under the skin into the appropriate area as directed 1 (One) Time Per Week.   Dispense: 4 pen; Refill: 5      Acute back pain right-sided low sacroiliac joint area low lumbar, will do x-rays, pelvic x-rays,    overwgt issues---wgt rx with ozempic vs wellbutrin discussed aug 9, 2022, will start with Ozempic, prescription sent in August 9, 2022, patient will follow-up in 3 months for weight loss issues,    Mixed hyperlipidemia mild, treatment options,, slightly improved August 1, 2022, continue exercise weight loss efforts,    Diabetes type 2, ---hemoglobin A1c up to 6.3,, February 2022--- diagnosis discussed,, improved to 5.9 August 1, 2022, discussions about treatment options to include Ozempic, Trulicity, consider Wellbutrin, would avoid stimulating drugs such as phentermine, August 9, 2022    Obstructive sleep apnea continues on CPAP machine, previous sleep study November 21, 2019 tolerating well,    Ménière syndrome, continues Dyazide,--continues on gabapentin 300 nightly 100 twice daily, discussion about stopping tapering discussed February 2022    History of elevated liver enzymes in the past, previous work-up March 2018 all negative ultrasound the liver showed some fatty replacement    Vitamin D deficiency previously, continue vitamin D, aerobic and weightbearing exercises, August 9, 2022    Bone density August 1, 2022 shows osteopenia of the lumbar and hip area, T-scores of  Neg 0.8 and 1.0 ,    Colonoscopy polyps present  Began in November 2019 follow-up again in 2022              Follow Up   Return in about 3 months (around 11/9/2022).  Patient was given instructions and counseling regarding her condition or for health maintenance advice. Please see specific information pulled into the AVS if appropriate.

## 2022-08-11 ENCOUNTER — TELEPHONE (OUTPATIENT)
Dept: INTERNAL MEDICINE | Facility: CLINIC | Age: 72
End: 2022-08-11

## 2022-08-11 RX ORDER — DULAGLUTIDE 0.75 MG/.5ML
0.75 INJECTION, SOLUTION SUBCUTANEOUS WEEKLY
Qty: 4 PEN | Refills: 5 | Status: SHIPPED | OUTPATIENT
Start: 2022-08-11 | End: 2022-11-10

## 2022-08-11 NOTE — TELEPHONE ENCOUNTER
Caller: Alison Mackenzie    Relationship: Self    Best call back number: [phone]     What was the call regarding PT WAS TOLD BY DR TROY TO CALL BACK IF HER INS WONT PAY FOR HER OZEMPIC AND HE WILL PRESCRIBE HER SOMETHING ELSE. SHE STATES IT WOULD COST HER OVER $400 A MONTH FOR THE OZEMPIC SO SHE WOULD LIKE SOMETHING LESS EXPENSIVE CALLED IN    Do you require a callback: YES          
Call patient back, tell her we will try Trulicity and I have already sent this into her pharmacy, if this is not covered either then there is no other diabetic medication that we could use but there is a weight loss medication called Saxenda, but is an injection that you have to do every day and we could try that 1, it is still going to be possibly expensive but we could try that if the Trulicity is not covered  
I have let the patient know of this.   
Clear

## 2022-08-16 ENCOUNTER — HOSPITAL ENCOUNTER (OUTPATIENT)
Dept: MAMMOGRAPHY | Facility: HOSPITAL | Age: 72
Discharge: HOME | End: 2022-08-16

## 2022-08-16 ENCOUNTER — HOSPITAL ENCOUNTER (OUTPATIENT)
Dept: ULTRASOUND IMAGING | Facility: HOSPITAL | Age: 72
Discharge: HOME | End: 2022-08-16

## 2022-08-16 ENCOUNTER — TELEPHONE (OUTPATIENT)
Dept: INTERNAL MEDICINE | Facility: CLINIC | Age: 72
End: 2022-08-16

## 2022-08-16 DIAGNOSIS — R92.8 ABNORMAL MAMMOGRAM: ICD-10-CM

## 2022-08-16 PROCEDURE — G0279 TOMOSYNTHESIS, MAMMO: HCPCS

## 2022-08-16 PROCEDURE — 77065 DX MAMMO INCL CAD UNI: CPT

## 2022-08-16 NOTE — TELEPHONE ENCOUNTER
I called patient with mammogram results, told her there was some concern about calcium but the radiologist felt these were similar to previous areas it had been previously biopsied in both breast and they recommend a follow-up again in 1 year

## 2022-08-18 ENCOUNTER — TELEPHONE (OUTPATIENT)
Dept: INTERNAL MEDICINE | Facility: CLINIC | Age: 72
End: 2022-08-18

## 2022-08-18 ENCOUNTER — HOSPITAL ENCOUNTER (OUTPATIENT)
Dept: GENERAL RADIOLOGY | Facility: HOSPITAL | Age: 72
Discharge: HOME | End: 2022-08-18

## 2022-08-18 DIAGNOSIS — Z99.89 OSA ON CPAP: ICD-10-CM

## 2022-08-18 DIAGNOSIS — M81.0 OSTEOPOROSIS, UNSPECIFIED OSTEOPOROSIS TYPE, UNSPECIFIED PATHOLOGICAL FRACTURE PRESENCE: ICD-10-CM

## 2022-08-18 DIAGNOSIS — H81.01 MENIERE DISEASE, RIGHT: ICD-10-CM

## 2022-08-18 DIAGNOSIS — R73.01 IFG (IMPAIRED FASTING GLUCOSE): ICD-10-CM

## 2022-08-18 DIAGNOSIS — E78.2 MIXED HYPERLIPIDEMIA: ICD-10-CM

## 2022-08-18 DIAGNOSIS — R74.8 ELEVATED LIVER ENZYMES: ICD-10-CM

## 2022-08-18 DIAGNOSIS — R92.8 ABNORMAL MAMMOGRAM: ICD-10-CM

## 2022-08-18 DIAGNOSIS — G47.33 OSA ON CPAP: ICD-10-CM

## 2022-08-18 DIAGNOSIS — G62.9 PERIPHERAL POLYNEUROPATHY: ICD-10-CM

## 2022-08-18 DIAGNOSIS — E55.9 VITAMIN D DEFICIENCY: ICD-10-CM

## 2022-08-18 DIAGNOSIS — M54.50 BACK PAIN AT L4-L5 LEVEL: ICD-10-CM

## 2022-08-18 PROCEDURE — 72100 X-RAY EXAM L-S SPINE 2/3 VWS: CPT

## 2022-08-18 PROCEDURE — 72170 X-RAY EXAM OF PELVIS: CPT

## 2022-08-18 NOTE — TELEPHONE ENCOUNTER
Call patient tell her there was no significant findings on the pelvis x-ray, just minimal degenerative arthritic changes of both hips,, and her low back they did not see any fractures mild degenerative changes in the low back,

## 2022-08-20 DIAGNOSIS — G62.9 PERIPHERAL POLYNEUROPATHY: ICD-10-CM

## 2022-08-22 RX ORDER — GABAPENTIN 100 MG/1
CAPSULE ORAL
Qty: 180 CAPSULE | Refills: 0 | Status: SHIPPED | OUTPATIENT
Start: 2022-08-22 | End: 2022-11-28

## 2022-08-22 RX ORDER — GABAPENTIN 300 MG/1
300 CAPSULE ORAL
Qty: 90 CAPSULE | Refills: 0 | Status: SHIPPED | OUTPATIENT
Start: 2022-08-22 | End: 2022-11-28

## 2022-11-09 ENCOUNTER — LAB (OUTPATIENT)
Dept: LAB | Facility: HOSPITAL | Age: 72
End: 2022-11-09

## 2022-11-09 DIAGNOSIS — Z99.89 OSA ON CPAP: ICD-10-CM

## 2022-11-09 DIAGNOSIS — H81.01 MENIERE DISEASE, RIGHT: ICD-10-CM

## 2022-11-09 DIAGNOSIS — E55.9 VITAMIN D DEFICIENCY: ICD-10-CM

## 2022-11-09 DIAGNOSIS — G62.9 PERIPHERAL POLYNEUROPATHY: ICD-10-CM

## 2022-11-09 DIAGNOSIS — R73.01 IFG (IMPAIRED FASTING GLUCOSE): ICD-10-CM

## 2022-11-09 DIAGNOSIS — E78.2 MIXED HYPERLIPIDEMIA: ICD-10-CM

## 2022-11-09 DIAGNOSIS — M81.0 OSTEOPOROSIS, UNSPECIFIED OSTEOPOROSIS TYPE, UNSPECIFIED PATHOLOGICAL FRACTURE PRESENCE: ICD-10-CM

## 2022-11-09 DIAGNOSIS — G47.33 OSA ON CPAP: ICD-10-CM

## 2022-11-09 DIAGNOSIS — R92.8 ABNORMAL MAMMOGRAM: ICD-10-CM

## 2022-11-09 DIAGNOSIS — M54.50 BACK PAIN AT L4-L5 LEVEL: ICD-10-CM

## 2022-11-09 DIAGNOSIS — R74.8 ELEVATED LIVER ENZYMES: ICD-10-CM

## 2022-11-09 LAB
ALBUMIN SERPL-MCNC: 3.9 G/DL (ref 3.5–5.2)
ALBUMIN/GLOB SERPL: 1.5 G/DL
ALP SERPL-CCNC: 86 U/L (ref 39–117)
ALT SERPL W P-5'-P-CCNC: 26 U/L (ref 1–33)
ANION GAP SERPL CALCULATED.3IONS-SCNC: 11.3 MMOL/L (ref 5–15)
AST SERPL-CCNC: 25 U/L (ref 1–32)
BILIRUB SERPL-MCNC: 0.4 MG/DL (ref 0–1.2)
BUN SERPL-MCNC: 16 MG/DL (ref 8–23)
BUN/CREAT SERPL: 16.7 (ref 7–25)
CALCIUM SPEC-SCNC: 9.8 MG/DL (ref 8.6–10.5)
CHLORIDE SERPL-SCNC: 103 MMOL/L (ref 98–107)
CO2 SERPL-SCNC: 25.7 MMOL/L (ref 22–29)
CREAT SERPL-MCNC: 0.96 MG/DL (ref 0.57–1)
EGFRCR SERPLBLD CKD-EPI 2021: 63.8 ML/MIN/1.73
GLOBULIN UR ELPH-MCNC: 2.6 GM/DL
GLUCOSE SERPL-MCNC: 84 MG/DL (ref 65–99)
HBA1C MFR BLD: 5.7 % (ref 4.8–5.6)
POTASSIUM SERPL-SCNC: 4.5 MMOL/L (ref 3.5–5.2)
PROT SERPL-MCNC: 6.5 G/DL (ref 6–8.5)
SODIUM SERPL-SCNC: 140 MMOL/L (ref 136–145)

## 2022-11-09 PROCEDURE — 36415 COLL VENOUS BLD VENIPUNCTURE: CPT

## 2022-11-09 PROCEDURE — 83036 HEMOGLOBIN GLYCOSYLATED A1C: CPT

## 2022-11-09 PROCEDURE — 80053 COMPREHEN METABOLIC PANEL: CPT

## 2022-11-10 ENCOUNTER — OFFICE VISIT (OUTPATIENT)
Dept: INTERNAL MEDICINE | Facility: CLINIC | Age: 72
End: 2022-11-10

## 2022-11-10 VITALS
RESPIRATION RATE: 18 BRPM | WEIGHT: 187.6 LBS | OXYGEN SATURATION: 97 % | TEMPERATURE: 97.9 F | DIASTOLIC BLOOD PRESSURE: 64 MMHG | HEART RATE: 87 BPM | HEIGHT: 65 IN | BODY MASS INDEX: 31.25 KG/M2 | SYSTOLIC BLOOD PRESSURE: 118 MMHG

## 2022-11-10 DIAGNOSIS — Z11.59 NEED FOR HEPATITIS C SCREENING TEST: Primary | ICD-10-CM

## 2022-11-10 DIAGNOSIS — E66.09 CLASS 1 OBESITY DUE TO EXCESS CALORIES WITH SERIOUS COMORBIDITY AND BODY MASS INDEX (BMI) OF 31.0 TO 31.9 IN ADULT: ICD-10-CM

## 2022-11-10 DIAGNOSIS — Z00.00 MEDICARE ANNUAL WELLNESS VISIT, INITIAL: ICD-10-CM

## 2022-11-10 DIAGNOSIS — Z23 NEED FOR PNEUMOCOCCAL VACCINATION: ICD-10-CM

## 2022-11-10 PROCEDURE — 1170F FXNL STATUS ASSESSED: CPT

## 2022-11-10 PROCEDURE — G0439 PPPS, SUBSEQ VISIT: HCPCS

## 2022-11-10 PROCEDURE — 1159F MED LIST DOCD IN RCRD: CPT

## 2022-11-10 PROCEDURE — 90677 PCV20 VACCINE IM: CPT

## 2022-11-10 PROCEDURE — G0009 ADMIN PNEUMOCOCCAL VACCINE: HCPCS

## 2022-11-10 RX ORDER — DULAGLUTIDE 1.5 MG/.5ML
1.5 INJECTION, SOLUTION SUBCUTANEOUS WEEKLY
Qty: 2 ML | Refills: 2 | Status: SHIPPED | OUTPATIENT
Start: 2022-11-10 | End: 2023-02-10

## 2022-11-10 NOTE — ASSESSMENT & PLAN NOTE
Patient's (Body mass index is 31.22 kg/m².) indicates that they are obese (BMI >30) with health conditions that include hypertension and osteoarthritis . Weight is improving with treatment. BMI is is above average; BMI management plan is completed. We discussed portion control, increasing exercise and pharmacologic options including Trulicity.  Patient was about 10 pounds since starting Trulicity.  Patient is doing well with the medication.  Denies much side effects.  She is also up 10 pounds over the last 3 months.  Plan: We will increase Trulicity.  Discussed risk benefits and side effects with patient.  Patient to report any side effects

## 2022-11-10 NOTE — PROGRESS NOTES
The ABCs of the Annual Wellness Visit  Initial Medicare Wellness Visit    Chief Complaint   Patient presents with   • Follow-up     Pt states that she is being seen for a follow up and she states that she feels like she cannot loose more than 10 pounds.      Subjective   History of Present Illness:  Alison Mackenzie is a 71 y.o. female who presents for an Initial Medicare Wellness Visit.    The following portions of the patient's history were reviewed and   updated as appropriate: allergies, current medications, past family history, past medical history, past social history, past surgical history and problem list.     Compared to one year ago, the patient feels her physical   health is the same.    Compared to one year ago, the patient feels her mental   health is the same.    Recent Hospitalizations:  She was not admitted to the hospital during the last year.       Current Medical Providers:  Patient Care Team:  Watson Johansen MD as PCP - General (Internal Medicine)    Outpatient Medications Prior to Visit   Medication Sig Dispense Refill   • cholecalciferol (VITAMIN D3) 25 MCG (1000 UT) tablet      • citalopram (CeleXA) 20 MG tablet Take 1 tablet by mouth Daily. 90 tablet 3   • gabapentin (NEURONTIN) 100 MG capsule TAKE 1 CAPSULE BY MOUTH TWICE A  capsule 0   • gabapentin (NEURONTIN) 300 MG capsule TAKE 1 CAPSULE BY MOUTH EVERY NIGHT AT BEDTIME. 90 capsule 0   • triamterene-hydrochlorothiazide (DYAZIDE) 37.5-25 MG per capsule Take 1 capsule by mouth Daily. 90 capsule 3   • Dulaglutide (Trulicity) 0.75 MG/0.5ML solution pen-injector Inject 0.75 mg under the skin into the appropriate area as directed 1 (One) Time Per Week. 4 pen 5   • Semaglutide,0.25 or 0.5MG/DOS, (Ozempic, 0.25 or 0.5 MG/DOSE,) 2 MG/1.5ML solution pen-injector Inject 0.25 mg under the skin into the appropriate area as directed 1 (One) Time Per Week. 4 pen 5     No facility-administered medications prior to visit.       No  "opioid medication identified on active medication list. I have reviewed chart for other potential  high risk medication/s and harmful drug interactions in the elderly.          Aspirin is not on active medication list.  Aspirin use is not indicated based on review of current medical condition/s. Risk of harm outweighs potential benefits.  .    Patient Active Problem List   Diagnosis   • Tinnitus of both ears   • Mixed hyperlipidemia   • IFG (impaired fasting glucose)   • Elevated liver enzymes   • Vitamin D deficiency   • Meniere disease, right   • INDIRA on CPAP   • Osteoporosis   • Middle ear effusion, bilateral   • Mixed hyperlipidemia   • Sleep apnea   • Vitamin D deficiency   • Abnormal mammogram   • Peripheral polyneuropathy   • Back pain at L4-L5 level   • Obesity     Advance Care Planning  Advance Directive is not on file.  ACP discussion was held with the patient during this visit. Patient has an advance directive (not in EMR), copy requested.          Objective       Vitals:    11/10/22 1024   BP: 118/64   BP Location: Left arm   Pulse: 87   Resp: 18   Temp: 97.9 °F (36.6 °C)   TempSrc: Temporal   SpO2: 97%   Weight: 85.1 kg (187 lb 9.6 oz)   Height: 165.1 cm (65\")     Estimated body mass index is 31.22 kg/m² as calculated from the following:    Height as of this encounter: 165.1 cm (65\").    Weight as of this encounter: 85.1 kg (187 lb 9.6 oz).    BMI is >= 30 and <35. (Class 1 Obesity). The following options were offered after discussion;: exercise counseling/recommendations, nutrition counseling/recommendations and pharmacological intervention options      Does the patient have evidence of cognitive impairment? No    Physical Exam  Lab Results   Component Value Date    HGBA1C 5.70 (H) 11/09/2022          HEALTH RISK ASSESSMENT    Smoking Status:  Social History     Tobacco Use   Smoking Status Never   Smokeless Tobacco Never     Alcohol Consumption:  Social History     Substance and Sexual Activity "   Alcohol Use Never     Fall Risk Screen:    STEADI Fall Risk Assessment was completed, and patient is at LOW risk for falls.Assessment completed on:11/10/2022    Depression Screen:   PHQ-2/PHQ-9 Depression Screening 11/10/2022   Little Interest or Pleasure in Doing Things 0-->not at all   Feeling Down, Depressed or Hopeless 0-->not at all   PHQ-9: Brief Depression Severity Measure Score 0       Health Habits and Functional and Cognitive Screening:  Functional & Cognitive Status 11/10/2022   Do you have difficulty preparing food and eating? No   Do you have difficulty bathing yourself, getting dressed or grooming yourself? No   Do you have difficulty using the toilet? No   Do you have difficulty moving around from place to place? No   Do you have trouble with steps or getting out of a bed or a chair? No   Current Diet Limited Junk Food   Dental Exam Up to date   Eye Exam Up to date   Exercise (times per week) 3 times per week   Current Exercises Include Walking;Yard Work   Do you need help using the phone?  No   Are you deaf or do you have serious difficulty hearing?  No   Do you need help with transportation? No   Do you need help shopping? No   Do you need help preparing meals?  No   Do you need help with housework?  No   Do you need help with laundry? No   Do you need help taking your medications? No   Do you need help managing money? No   Do you ever drive or ride in a car without wearing a seat belt? No   Have you felt unusual stress, anger or loneliness in the last month? No   Who do you live with? Spouse   If you need help, do you have trouble finding someone available to you? No   Have you been bothered in the last four weeks by sexual problems? No   Do you have difficulty concentrating, remembering or making decisions? No       Age-appropriate Screening Schedule:  Refer to the list below for future screening recommendations based on patient's age, sex and/or medical conditions. Orders for these recommended  tests are listed in the plan section. The patient has been provided with a written plan.    Health Maintenance   Topic Date Due   • TDAP/TD VACCINES (1 - Tdap) Never done   • LIPID PANEL  08/01/2023   • DXA SCAN  08/01/2024   • MAMMOGRAM  08/16/2024   • INFLUENZA VACCINE  Completed   • ZOSTER VACCINE  Completed   TDAP-Q 10 years.  Counseled  Shingrex-completed  COVLD-19 booster-counseled  Pneumonia will get today.         Assessment & Plan   CMS Preventative Services Quick Reference  Risk Factors Identified During Encounter  Immunizations Discussed/Encouraged (specific Immunizations; Tdap, Influenza, Pneumococcal 23, Shingrix and COVID19  Obesity/Overweight   The above risks/problems have been discussed with the patient.  Follow up actions/plans if indicated are seen below in the Assessment/Plan Section.  Pertinent information has been shared with the patient in the After Visit Summary.    Diagnoses and all orders for this visit:    1. Need for hepatitis C screening test (Primary)  -     Hepatitis C antibody; Future    2. Medicare annual wellness visit, initial    3. Class 1 obesity due to excess calories with serious comorbidity and body mass index (BMI) of 31.0 to 31.9 in adult  Assessment & Plan:  Patient's (Body mass index is 31.22 kg/m².) indicates that they are obese (BMI >30) with health conditions that include hypertension and osteoarthritis . Weight is improving with treatment. BMI is is above average; BMI management plan is completed. We discussed portion control, increasing exercise and pharmacologic options including Trulicity.  Patient was about 10 pounds since starting Trulicity.  Patient is doing well with the medication.  Denies much side effects.  She is also up 10 pounds over the last 3 months.  Plan: We will increase Trulicity.  Discussed risk benefits and side effects with patient.  Patient to report any side effects      4. Need for pneumococcal vaccination  -     Pneumococcal Conjugate Vaccine  20-Valent (PCV20)    Other orders  -     Dulaglutide (Trulicity) 1.5 MG/0.5ML solution pen-injector; Inject 1.5 mg under the skin into the appropriate area as directed 1 (One) Time Per Week.  Dispense: 2 mL; Refill: 2      Follow Up:  Return for Next scheduled follow up.     An After Visit Summary and PPPS were made available to the patient.

## 2022-11-10 NOTE — PROGRESS NOTES
"Chief Complaint  Follow-up (Pt states that she is being seen for a follow up and she states that she feels like she cannot loose more than 10 pounds. )    History of Present Illness  SUBJECTIVE  Alison Mackenzie presents to [facility] follow up on weight.  Pt was started on trulicity to help her lose weight. She has lost about 8-10 pounds.   She is also walking 2 miles/day.      Past Medical History:   Diagnosis Date   • Depression    • Middle ear effusion, bilateral    • Obesity       Family History   Problem Relation Age of Onset   • Kidney cancer Father 70   • Cancer Father         Renal cell   • Depression Mother       Past Surgical History:   Procedure Laterality Date   • COLONOSCOPY      15+ years ago   • CYSTOSCOPY     • HYSTERECTOMY          Current Outpatient Medications:   •  cholecalciferol (VITAMIN D3) 25 MCG (1000 UT) tablet, , Disp: , Rfl:   •  citalopram (CeleXA) 20 MG tablet, Take 1 tablet by mouth Daily., Disp: 90 tablet, Rfl: 3  •  gabapentin (NEURONTIN) 100 MG capsule, TAKE 1 CAPSULE BY MOUTH TWICE A DAY, Disp: 180 capsule, Rfl: 0  •  gabapentin (NEURONTIN) 300 MG capsule, TAKE 1 CAPSULE BY MOUTH EVERY NIGHT AT BEDTIME., Disp: 90 capsule, Rfl: 0  •  triamterene-hydrochlorothiazide (DYAZIDE) 37.5-25 MG per capsule, Take 1 capsule by mouth Daily., Disp: 90 capsule, Rfl: 3  •  Dulaglutide (Trulicity) 1.5 MG/0.5ML solution pen-injector, Inject 1.5 mg under the skin into the appropriate area as directed 1 (One) Time Per Week., Disp: 2 mL, Rfl: 2    OBJECTIVE  Vital Signs:   /64 (BP Location: Left arm)   Pulse 87   Temp 97.9 °F (36.6 °C) (Temporal)   Resp 18   Ht 165.1 cm (65\")   Wt 85.1 kg (187 lb 9.6 oz)   SpO2 97%   BMI 31.22 kg/m²    Estimated body mass index is 31.22 kg/m² as calculated from the following:    Height as of this encounter: 165.1 cm (65\").    Weight as of this encounter: 85.1 kg (187 lb 9.6 oz).     Wt Readings from Last 3 " Encounters:   11/10/22 85.1 kg (187 lb 9.6 oz)   08/09/22 88.5 kg (195 lb)   02/09/22 86.4 kg (190 lb 6.4 oz)     BP Readings from Last 3 Encounters:   11/10/22 118/64   08/09/22 121/67   02/09/22 124/79       Physical Exam  Vitals and nursing note reviewed.   Constitutional:       Appearance: Normal appearance.   HENT:      Head: Normocephalic.   Eyes:      Extraocular Movements: Extraocular movements intact.      Conjunctiva/sclera: Conjunctivae normal.   Cardiovascular:      Rate and Rhythm: Normal rate and regular rhythm.      Heart sounds: Normal heart sounds.   Pulmonary:      Effort: Pulmonary effort is normal.      Breath sounds: Normal breath sounds. No wheezing or rales.   Abdominal:      General: Bowel sounds are normal. There is no distension.      Palpations: Abdomen is soft. There is no mass.      Tenderness: There is no abdominal tenderness. There is no right CVA tenderness, left CVA tenderness, guarding or rebound.      Hernia: No hernia is present.   Musculoskeletal:         General: No swelling. Normal range of motion.      Cervical back: Normal range of motion and neck supple.   Skin:     General: Skin is warm and dry.   Neurological:      General: No focal deficit present.      Mental Status: She is alert and oriented to person, place, and time. Mental status is at baseline.   Psychiatric:         Mood and Affect: Mood normal.         Behavior: Behavior normal.         Thought Content: Thought content normal.         Judgment: Judgment normal.          Result Review    CMP    CMP 2/7/22 8/1/22 11/9/22   Glucose 93 99 84   BUN 19 15 16   Creatinine 0.90 0.81 0.96   eGFR Non African Am 62     Sodium 140 145 140   Potassium 4.4 4.3 4.5   Chloride 103 108 (A) 103   Calcium 9.5 9.0 9.8   Albumin 4.00 3.90 3.90   Total Bilirubin 0.3 0.3 0.4   Alkaline Phosphatase 81 77 86   AST (SGOT) 20 24 25   ALT (SGPT) 27 28 26   (A) Abnormal value                  XR Spine Lumbar 2 or 3 View    Result Date:  8/18/2022    1. No acute osseous abnormality is identified on lumbar spine x-rays. 2. Mild degenerative changes in the lower lumbar spine. 3. Transitional lumbosacral junction.     ORLANDO BOOKER MD       Electronically Signed and Approved By: ORLANDO BOOKER MD on 8/18/2022 at 15:37             XR Pelvis 1 or 2 View    Result Date: 8/18/2022    1. Minimal degenerative change of the hips.  No acute bony abnormality.      ANISA BHATIA MD       Electronically Signed and Approved By: ANISA BHATIA MD on 8/18/2022 at 15:45             DEXA Bone Density Axial    Result Date: 8/1/2022  Osteopenia in the lumbar spine and hips.     VERA WEST MD       Electronically Signed and Approved By: VERA WEST MD on 8/01/2022 at 12:57             Mammo Screening Digital Tomosynthesis Bilateral With CAD    Result Date: 8/1/2022   Right breast:  Benign mammogram. Suggest routine mammographic screening.  Left breast:  Grouped calcification upper central slightly outer left breast.  RECOMMENDATION(S):  ADDITIONAL MAMMOGRAPHIC VIEWS REQUIRED: LEFT BREAST  --NEED ADDITIONAL IMAGING EVALUATION.   BIRADS:  DIAGNOSTIC CATEGORY 0--INCOMPLETE: NEEDS ADDITIONAL IMAGING EVALUATION.   BREAST COMPOSITION: Heterogeneously dense,which may obscure small masses.  PLEASE NOTE:  A NORMAL MAMMOGRAM DOES NOT EXCLUDE THE POSSIBILITY OF BREAST CANCER. ANY CLINICALLY SUSPICIOUS PALPABLE LUMP SHOULD BE BIOPSIED.      Zac Garces M.D.       Electronically Signed and Approved By: aZc Garces M.D. on 8/01/2022 at 15:56                The above data has been reviewed by CARLOTTA Riley 11/10/2022 10:35 EST.          Patient Care Team:  Watson Johansen MD as PCP - General (Internal Medicine)           ASSESSMENT & PLAN    Diagnoses and all orders for this visit:    1. Need for hepatitis C screening test (Primary)  -     Hepatitis C antibody; Future    2. Medicare annual wellness visit, initial    3. Class 1 obesity due to  excess calories with serious comorbidity and body mass index (BMI) of 31.0 to 31.9 in adult  Assessment & Plan:  Patient's (Body mass index is 31.22 kg/m².) indicates that they are obese (BMI >30) with health conditions that include hypertension and osteoarthritis . Weight is improving with treatment. BMI is is above average; BMI management plan is completed. We discussed portion control, increasing exercise and pharmacologic options including Trulicity.  Patient was about 10 pounds since starting Trulicity.  Patient is doing well with the medication.  Denies much side effects.  She is also up 10 pounds over the last 3 months.  Plan: We will increase Trulicity.  Discussed risk benefits and side effects with patient.  Patient to report any side effects      Other orders  -     Dulaglutide (Trulicity) 1.5 MG/0.5ML solution pen-injector; Inject 1.5 mg under the skin into the appropriate area as directed 1 (One) Time Per Week.  Dispense: 2 mL; Refill: 2       Tobacco Use: Low Risk    • Smoking Tobacco Use: Never   • Smokeless Tobacco Use: Never   • Passive Exposure: Not on file       Follow Up     No follow-ups on file.    Please note that portions of this note were completed with a voice recognition program.    Patient was given instructions and counseling regarding her condition or for health maintenance advice. Please see specific information pulled into the AVS if appropriate.   I have reviewed information obtained and documented by others and I have confirmed the accuracy of this documented note.    CARLOTTA Riley

## 2022-11-24 DIAGNOSIS — G62.9 PERIPHERAL POLYNEUROPATHY: ICD-10-CM

## 2022-11-28 RX ORDER — GABAPENTIN 300 MG/1
300 CAPSULE ORAL
Qty: 90 CAPSULE | Refills: 1 | Status: SHIPPED | OUTPATIENT
Start: 2022-11-28

## 2022-11-28 RX ORDER — GABAPENTIN 100 MG/1
CAPSULE ORAL
Qty: 180 CAPSULE | Refills: 1 | Status: SHIPPED | OUTPATIENT
Start: 2022-11-28

## 2022-12-09 ENCOUNTER — OFFICE VISIT (OUTPATIENT)
Dept: INTERNAL MEDICINE | Facility: CLINIC | Age: 72
End: 2022-12-09

## 2022-12-09 VITALS
OXYGEN SATURATION: 97 % | RESPIRATION RATE: 18 BRPM | HEART RATE: 78 BPM | HEIGHT: 65 IN | TEMPERATURE: 96.6 F | SYSTOLIC BLOOD PRESSURE: 127 MMHG | DIASTOLIC BLOOD PRESSURE: 85 MMHG | BODY MASS INDEX: 31.92 KG/M2 | WEIGHT: 191.6 LBS

## 2022-12-09 DIAGNOSIS — R73.03 PREDIABETES: ICD-10-CM

## 2022-12-09 DIAGNOSIS — E66.09 CLASS 1 OBESITY DUE TO EXCESS CALORIES WITH SERIOUS COMORBIDITY AND BODY MASS INDEX (BMI) OF 31.0 TO 31.9 IN ADULT: Primary | ICD-10-CM

## 2022-12-09 PROCEDURE — 99213 OFFICE O/P EST LOW 20 MIN: CPT

## 2022-12-09 NOTE — PROGRESS NOTES
"Chief Complaint  Follow-up (Pt states that she is being seen for a medication review. She states that her medication is to expressive and is wanting to see what she can next.)    History of Present Illness  SUBJECTIVE  Alison Mackenzie presents to [facility] follow-up on weight.      Past Medical History:   Diagnosis Date   • Depression    • Middle ear effusion, bilateral    • Obesity       Family History   Problem Relation Age of Onset   • Kidney cancer Father 70   • Cancer Father         Renal cell   • Depression Mother       Past Surgical History:   Procedure Laterality Date   • COLONOSCOPY      15+ years ago   • CYSTOSCOPY     • HYSTERECTOMY          Current Outpatient Medications:   •  cholecalciferol (VITAMIN D3) 25 MCG (1000 UT) tablet, , Disp: , Rfl:   •  citalopram (CeleXA) 20 MG tablet, Take 1 tablet by mouth Daily., Disp: 90 tablet, Rfl: 3  •  Dulaglutide (Trulicity) 1.5 MG/0.5ML solution pen-injector, Inject 1.5 mg under the skin into the appropriate area as directed 1 (One) Time Per Week., Disp: 2 mL, Rfl: 2  •  gabapentin (NEURONTIN) 100 MG capsule, TAKE 1 CAPSULE BY MOUTH TWICE A DAY, Disp: 180 capsule, Rfl: 1  •  gabapentin (NEURONTIN) 300 MG capsule, TAKE 1 CAPSULE BY MOUTH EVERY NIGHT AT BEDTIME., Disp: 90 capsule, Rfl: 1  •  triamterene-hydrochlorothiazide (DYAZIDE) 37.5-25 MG per capsule, Take 1 capsule by mouth Daily., Disp: 90 capsule, Rfl: 3    OBJECTIVE  Vital Signs:   /85 (BP Location: Right arm)   Pulse 78   Temp 96.6 °F (35.9 °C) (Temporal)   Resp 18   Ht 165.1 cm (65\")   Wt 86.9 kg (191 lb 9.6 oz)   SpO2 97%   BMI 31.88 kg/m²    Estimated body mass index is 31.88 kg/m² as calculated from the following:    Height as of this encounter: 165.1 cm (65\").    Weight as of this encounter: 86.9 kg (191 lb 9.6 oz).     Wt Readings from Last 3 Encounters:   12/09/22 86.9 kg (191 lb 9.6 oz)   11/10/22 85.1 kg (187 lb 9.6 oz)   08/09/22 88.5 kg (195 " lb)     BP Readings from Last 3 Encounters:   12/09/22 127/85   11/10/22 118/64   08/09/22 121/67       Physical Exam  Vitals and nursing note reviewed.   Constitutional:       Appearance: Normal appearance. She is obese.   HENT:      Head: Normocephalic.   Eyes:      Extraocular Movements: Extraocular movements intact.      Conjunctiva/sclera: Conjunctivae normal.   Cardiovascular:      Rate and Rhythm: Normal rate and regular rhythm.      Heart sounds: Normal heart sounds. No murmur heard.  Pulmonary:      Effort: Pulmonary effort is normal.      Breath sounds: Normal breath sounds. No wheezing or rales.   Abdominal:      General: Bowel sounds are normal.      Palpations: Abdomen is soft.      Tenderness: There is no abdominal tenderness. There is no guarding.   Musculoskeletal:         General: No swelling. Normal range of motion.   Skin:     General: Skin is warm and dry.   Neurological:      General: No focal deficit present.      Mental Status: She is alert. Mental status is at baseline.   Psychiatric:         Mood and Affect: Mood normal.         Thought Content: Thought content normal.          Result Review      XR Spine Lumbar 2 or 3 View    Result Date: 8/18/2022    1. No acute osseous abnormality is identified on lumbar spine x-rays. 2. Mild degenerative changes in the lower lumbar spine. 3. Transitional lumbosacral junction.     ORLANDO BOOKER MD       Electronically Signed and Approved By: ORLANDO BOOKER MD on 8/18/2022 at 15:37             XR Pelvis 1 or 2 View    Result Date: 8/18/2022    1. Minimal degenerative change of the hips.  No acute bony abnormality.      ANISA BHATIA MD       Electronically Signed and Approved By: ANISA BHATIA MD on 8/18/2022 at 15:45                The above data has been reviewed by CARLOTTA Riley 12/09/2022 11:04 EST.          Patient Care Team:  Watson Johansen MD as PCP - General (Internal Medicine)       ASSESSMENT & PLAN    Diagnoses and all orders  for this visit:    1. Class 1 obesity due to excess calories with serious comorbidity and body mass index (BMI) of 31.0 to 31.9 in adult (Primary)  Assessment & Plan:  Patient's (Body mass index is 31.88 kg/m².) indicates that they are obese (BMI >30) with health conditions that include hypertension and dyslipidemias . Weight is worsening.  Patient is currently in the Medicare donut hole was unable to get Trulicity picked up from the pharmacy due to cost.  Patient states that it was working really well for her and would like to stay on it if possible.  We do have samples at this time.  I gave her samples of 0.75 mg for her to do that for 2 weeks and then she can move up to the 1.5 mg weekly for 2 weeks.  Patient will follow-up with us in a month and we will reevaluate at that time.  Patient tolerates medication well.  States that really helped with her appetite.  Risk and benefits and goals of treatment were discussed with patient.      2. Prediabetes       Tobacco Use: Low Risk    • Smoking Tobacco Use: Never   • Smokeless Tobacco Use: Never   • Passive Exposure: Not on file       Follow Up     Return in about 4 weeks (around 1/6/2023), or if symptoms worsen or fail to improve.    Please note that portions of this note were completed with a voice recognition program.    Patient was given instructions and counseling regarding her condition or for health maintenance advice. Please see specific information pulled into the AVS if appropriate.   I have reviewed information obtained and documented by others and I have confirmed the accuracy of this documented note.    CARLOTTA Riley

## 2022-12-09 NOTE — ASSESSMENT & PLAN NOTE
Patient's (Body mass index is 31.88 kg/m².) indicates that they are obese (BMI >30) with health conditions that include hypertension and dyslipidemias . Weight is worsening.  Patient is currently in the Medicare donut hole was unable to get Trulicity picked up from the pharmacy due to cost.  Patient states that it was working really well for her and would like to stay on it if possible.  We do have samples at this time.  I gave her samples of 0.75 mg for her to do that for 2 weeks and then she can move up to the 1.5 mg weekly for 2 weeks.  Patient will follow-up with us in a month and we will reevaluate at that time.  Patient tolerates medication well.  States that really helped with her appetite.  Risk and benefits and goals of treatment were discussed with patient.

## 2023-01-10 ENCOUNTER — OFFICE VISIT (OUTPATIENT)
Dept: INTERNAL MEDICINE | Facility: CLINIC | Age: 73
End: 2023-01-10
Payer: MEDICARE

## 2023-01-10 VITALS
SYSTOLIC BLOOD PRESSURE: 126 MMHG | BODY MASS INDEX: 31.39 KG/M2 | HEIGHT: 65 IN | HEART RATE: 82 BPM | RESPIRATION RATE: 18 BRPM | DIASTOLIC BLOOD PRESSURE: 81 MMHG | OXYGEN SATURATION: 97 % | WEIGHT: 188.4 LBS | TEMPERATURE: 97.1 F

## 2023-01-10 DIAGNOSIS — E66.09 CLASS 1 OBESITY DUE TO EXCESS CALORIES WITH SERIOUS COMORBIDITY AND BODY MASS INDEX (BMI) OF 31.0 TO 31.9 IN ADULT: Primary | ICD-10-CM

## 2023-01-10 PROCEDURE — 99213 OFFICE O/P EST LOW 20 MIN: CPT

## 2023-01-10 RX ORDER — DULAGLUTIDE 0.75 MG/.5ML
0.75 INJECTION, SOLUTION SUBCUTANEOUS WEEKLY
COMMUNITY
Start: 2022-11-21 | End: 2023-02-10

## 2023-01-10 RX ORDER — BUPROPION HYDROCHLORIDE 150 MG/1
150 TABLET ORAL DAILY
Qty: 30 TABLET | Refills: 0 | Status: SHIPPED | OUTPATIENT
Start: 2023-01-10 | End: 2023-02-01 | Stop reason: SDUPTHER

## 2023-01-10 NOTE — ASSESSMENT & PLAN NOTE
Patient's (Body mass index is 31.35 kg/m².) indicates that they are obese (BMI >30) with health conditions that include hypertension and dyslipidemias . Weight is improving with treatment. BMI is is above average; BMI management plan is completed. We discussed portion control, increasing exercise and Weight Watchers or other Commercial based weight reduction program. Pt states trulicity has helped. She has lost a few pounds. Unfortunately, her insurance will not cover this drug or any of the other GLP-1s. Discussed other options.   Will start wellbutrin 150 mg qd.  Follow up in 1 month.  Risks/benefits discussed with pt

## 2023-01-10 NOTE — PROGRESS NOTES
Chief Complaint  Follow-up (Pt states that she is being seen for a follow up and is overall doing well. )    History of Present Illness  SUBJECTIVE  Alison Mackenzie presents to [facility]  To discuss weight      Past Medical History:   Diagnosis Date   • Depression    • Middle ear effusion, bilateral    • Obesity       Family History   Problem Relation Age of Onset   • Kidney cancer Father 70   • Cancer Father         Renal cell   • Depression Mother       Past Surgical History:   Procedure Laterality Date   • COLONOSCOPY      15+ years ago   • CYSTOSCOPY     • HYSTERECTOMY          Current Outpatient Medications:   •  cholecalciferol (VITAMIN D3) 25 MCG (1000 UT) tablet, , Disp: , Rfl:   •  citalopram (CeleXA) 20 MG tablet, Take 1 tablet by mouth Daily., Disp: 90 tablet, Rfl: 3  •  Dulaglutide (Trulicity) 1.5 MG/0.5ML solution pen-injector, Inject 1.5 mg under the skin into the appropriate area as directed 1 (One) Time Per Week., Disp: 2 mL, Rfl: 2  •  gabapentin (NEURONTIN) 100 MG capsule, TAKE 1 CAPSULE BY MOUTH TWICE A DAY, Disp: 180 capsule, Rfl: 1  •  gabapentin (NEURONTIN) 300 MG capsule, TAKE 1 CAPSULE BY MOUTH EVERY NIGHT AT BEDTIME., Disp: 90 capsule, Rfl: 1  •  triamterene-hydrochlorothiazide (DYAZIDE) 37.5-25 MG per capsule, Take 1 capsule by mouth Daily., Disp: 90 capsule, Rfl: 3  •  Trulicity 0.75 MG/0.5ML solution pen-injector, Inject 0.75 mg as directed 1 (One) Time Per Week., Disp: , Rfl:   •  buPROPion XL (Wellbutrin XL) 150 MG 24 hr tablet, Take 1 tablet by mouth Daily., Disp: 30 tablet, Rfl: 0    OBJECTIVE  Vital Signs:   /81 (BP Location: Left arm)   Pulse 82   Temp 97.1 °F (36.2 °C) (Temporal)   Resp 18   Ht 165.1 cm (65\")   Wt 85.5 kg (188 lb 6.4 oz)   SpO2 97%   BMI 31.35 kg/m²    Estimated body mass index is 31.35 kg/m² as calculated from the following:    Height as of this encounter: 165.1 cm (65\").    Weight as of this encounter: 85.5  kg (188 lb 6.4 oz).     Wt Readings from Last 3 Encounters:   01/10/23 85.5 kg (188 lb 6.4 oz)   12/09/22 86.9 kg (191 lb 9.6 oz)   11/10/22 85.1 kg (187 lb 9.6 oz)     BP Readings from Last 3 Encounters:   01/10/23 126/81   12/09/22 127/85   11/10/22 118/64       Physical Exam  Vitals and nursing note reviewed.   Constitutional:       Appearance: Normal appearance. She is obese.   HENT:      Head: Normocephalic.   Eyes:      Extraocular Movements: Extraocular movements intact.      Conjunctiva/sclera: Conjunctivae normal.   Cardiovascular:      Rate and Rhythm: Normal rate and regular rhythm.      Heart sounds: Normal heart sounds. No murmur heard.  Pulmonary:      Effort: Pulmonary effort is normal.      Breath sounds: Normal breath sounds. No wheezing or rales.   Abdominal:      General: Bowel sounds are normal.      Palpations: Abdomen is soft.      Tenderness: There is no abdominal tenderness. There is no guarding.   Musculoskeletal:         General: No swelling. Normal range of motion.   Skin:     General: Skin is warm and dry.   Neurological:      General: No focal deficit present.      Mental Status: She is alert. Mental status is at baseline.   Psychiatric:         Mood and Affect: Mood normal.         Thought Content: Thought content normal.          Result Review      No Images in the past 120 days found..     The above data has been reviewed by CARLOTTA Riley 01/10/2023 09:29 EST.          Patient Care Team:  Watson Johansen MD as PCP - General (Internal Medicine)       ASSESSMENT & PLAN    Diagnoses and all orders for this visit:    1. Class 1 obesity due to excess calories with serious comorbidity and body mass index (BMI) of 31.0 to 31.9 in adult (Primary)  Assessment & Plan:  Patient's (Body mass index is 31.35 kg/m².) indicates that they are obese (BMI >30) with health conditions that include hypertension and dyslipidemias . Weight is improving with treatment. BMI is is above average; BMI  management plan is completed. We discussed portion control, increasing exercise and Weight Watchers or other Commercial based weight reduction program. Pt states trulicity has helped. She has lost a few pounds. Unfortunately, her insurance will not cover this drug or any of the other GLP-1s. Discussed other options.   Will start wellbutrin 150 mg qd.  Follow up in 1 month.  Risks/benefits discussed with pt        Other orders  -     buPROPion XL (Wellbutrin XL) 150 MG 24 hr tablet; Take 1 tablet by mouth Daily.  Dispense: 30 tablet; Refill: 0       Tobacco Use: Low Risk    • Smoking Tobacco Use: Never   • Smokeless Tobacco Use: Never   • Passive Exposure: Not on file       Follow Up     Return in about 1 month (around 2/10/2023), or if symptoms worsen or fail to improve.    Please note that portions of this note were completed with a voice recognition program.    Patient was given instructions and counseling regarding her condition or for health maintenance advice. Please see specific information pulled into the AVS if appropriate.   I have reviewed information obtained and documented by others and I have confirmed the accuracy of this documented note.    CARLOTTA Riley

## 2023-01-18 RX ORDER — CITALOPRAM 20 MG/1
TABLET ORAL
Qty: 90 TABLET | Refills: 3 | Status: SHIPPED | OUTPATIENT
Start: 2023-01-18

## 2023-01-18 RX ORDER — TRIAMTERENE AND HYDROCHLOROTHIAZIDE 37.5; 25 MG/1; MG/1
CAPSULE ORAL
Qty: 90 CAPSULE | Refills: 3 | Status: SHIPPED | OUTPATIENT
Start: 2023-01-18

## 2023-02-01 RX ORDER — BUPROPION HYDROCHLORIDE 150 MG/1
150 TABLET ORAL DAILY
Qty: 30 TABLET | Refills: 0 | Status: SHIPPED | OUTPATIENT
Start: 2023-02-01 | End: 2023-02-06 | Stop reason: SDUPTHER

## 2023-02-02 ENCOUNTER — LAB (OUTPATIENT)
Dept: LAB | Facility: HOSPITAL | Age: 73
End: 2023-02-02
Payer: MEDICARE

## 2023-02-02 DIAGNOSIS — E55.9 VITAMIN D DEFICIENCY: ICD-10-CM

## 2023-02-02 DIAGNOSIS — R73.01 IFG (IMPAIRED FASTING GLUCOSE): ICD-10-CM

## 2023-02-02 DIAGNOSIS — R92.8 ABNORMAL MAMMOGRAM: ICD-10-CM

## 2023-02-02 DIAGNOSIS — M81.0 OSTEOPOROSIS, UNSPECIFIED OSTEOPOROSIS TYPE, UNSPECIFIED PATHOLOGICAL FRACTURE PRESENCE: ICD-10-CM

## 2023-02-02 DIAGNOSIS — G62.9 PERIPHERAL POLYNEUROPATHY: ICD-10-CM

## 2023-02-02 DIAGNOSIS — Z99.89 OSA ON CPAP: ICD-10-CM

## 2023-02-02 DIAGNOSIS — R74.8 ELEVATED LIVER ENZYMES: ICD-10-CM

## 2023-02-02 DIAGNOSIS — Z11.59 NEED FOR HEPATITIS C SCREENING TEST: ICD-10-CM

## 2023-02-02 DIAGNOSIS — G47.33 OSA ON CPAP: ICD-10-CM

## 2023-02-02 DIAGNOSIS — H81.01 MENIERE DISEASE, RIGHT: ICD-10-CM

## 2023-02-02 DIAGNOSIS — M54.50 BACK PAIN AT L4-L5 LEVEL: ICD-10-CM

## 2023-02-02 DIAGNOSIS — E78.2 MIXED HYPERLIPIDEMIA: ICD-10-CM

## 2023-02-02 LAB
25(OH)D3 SERPL-MCNC: 29.6 NG/ML (ref 30–100)
ALBUMIN SERPL-MCNC: 4.2 G/DL (ref 3.5–5.2)
ALBUMIN/GLOB SERPL: 1.9 G/DL
ALP SERPL-CCNC: 92 U/L (ref 39–117)
ALT SERPL W P-5'-P-CCNC: 32 U/L (ref 1–33)
ANION GAP SERPL CALCULATED.3IONS-SCNC: 9 MMOL/L (ref 5–15)
AST SERPL-CCNC: 26 U/L (ref 1–32)
BASOPHILS # BLD AUTO: 0.06 10*3/MM3 (ref 0–0.2)
BASOPHILS NFR BLD AUTO: 0.8 % (ref 0–1.5)
BILIRUB SERPL-MCNC: 0.4 MG/DL (ref 0–1.2)
BUN SERPL-MCNC: 16 MG/DL (ref 8–23)
BUN/CREAT SERPL: 16.3 (ref 7–25)
CALCIUM SPEC-SCNC: 9.5 MG/DL (ref 8.6–10.5)
CHLORIDE SERPL-SCNC: 102 MMOL/L (ref 98–107)
CHOLEST SERPL-MCNC: 176 MG/DL (ref 0–200)
CO2 SERPL-SCNC: 27 MMOL/L (ref 22–29)
CREAT SERPL-MCNC: 0.98 MG/DL (ref 0.57–1)
DEPRECATED RDW RBC AUTO: 40.2 FL (ref 37–54)
EGFRCR SERPLBLD CKD-EPI 2021: 61.8 ML/MIN/1.73
EOSINOPHIL # BLD AUTO: 0.24 10*3/MM3 (ref 0–0.4)
EOSINOPHIL NFR BLD AUTO: 3.2 % (ref 0.3–6.2)
ERYTHROCYTE [DISTWIDTH] IN BLOOD BY AUTOMATED COUNT: 12.2 % (ref 12.3–15.4)
GLOBULIN UR ELPH-MCNC: 2.2 GM/DL
GLUCOSE SERPL-MCNC: 109 MG/DL (ref 65–99)
HBA1C MFR BLD: 5.7 % (ref 4.8–5.6)
HCT VFR BLD AUTO: 44.1 % (ref 34–46.6)
HCV AB SER DONR QL: NORMAL
HDLC SERPL-MCNC: 41 MG/DL (ref 40–60)
HGB BLD-MCNC: 14.6 G/DL (ref 12–15.9)
IMM GRANULOCYTES # BLD AUTO: 0.03 10*3/MM3 (ref 0–0.05)
IMM GRANULOCYTES NFR BLD AUTO: 0.4 % (ref 0–0.5)
LDLC SERPL CALC-MCNC: 114 MG/DL (ref 0–100)
LDLC/HDLC SERPL: 2.72 {RATIO}
LYMPHOCYTES # BLD AUTO: 1.32 10*3/MM3 (ref 0.7–3.1)
LYMPHOCYTES NFR BLD AUTO: 17.5 % (ref 19.6–45.3)
MCH RBC QN AUTO: 30.1 PG (ref 26.6–33)
MCHC RBC AUTO-ENTMCNC: 33.1 G/DL (ref 31.5–35.7)
MCV RBC AUTO: 90.9 FL (ref 79–97)
MONOCYTES # BLD AUTO: 0.65 10*3/MM3 (ref 0.1–0.9)
MONOCYTES NFR BLD AUTO: 8.6 % (ref 5–12)
NEUTROPHILS NFR BLD AUTO: 5.24 10*3/MM3 (ref 1.7–7)
NEUTROPHILS NFR BLD AUTO: 69.5 % (ref 42.7–76)
NRBC BLD AUTO-RTO: 0 /100 WBC (ref 0–0.2)
PLATELET # BLD AUTO: 291 10*3/MM3 (ref 140–450)
PMV BLD AUTO: 11.7 FL (ref 6–12)
POTASSIUM SERPL-SCNC: 4.2 MMOL/L (ref 3.5–5.2)
PROT SERPL-MCNC: 6.4 G/DL (ref 6–8.5)
RBC # BLD AUTO: 4.85 10*6/MM3 (ref 3.77–5.28)
SODIUM SERPL-SCNC: 138 MMOL/L (ref 136–145)
TRIGL SERPL-MCNC: 118 MG/DL (ref 0–150)
VLDLC SERPL-MCNC: 21 MG/DL (ref 5–40)
WBC NRBC COR # BLD: 7.54 10*3/MM3 (ref 3.4–10.8)

## 2023-02-02 PROCEDURE — 36415 COLL VENOUS BLD VENIPUNCTURE: CPT

## 2023-02-02 PROCEDURE — 80061 LIPID PANEL: CPT

## 2023-02-02 PROCEDURE — 80053 COMPREHEN METABOLIC PANEL: CPT

## 2023-02-02 PROCEDURE — 85025 COMPLETE CBC W/AUTO DIFF WBC: CPT

## 2023-02-02 PROCEDURE — 83036 HEMOGLOBIN GLYCOSYLATED A1C: CPT

## 2023-02-02 PROCEDURE — 86803 HEPATITIS C AB TEST: CPT

## 2023-02-02 PROCEDURE — 82306 VITAMIN D 25 HYDROXY: CPT

## 2023-02-06 RX ORDER — BUPROPION HYDROCHLORIDE 150 MG/1
150 TABLET ORAL DAILY
Qty: 30 TABLET | Refills: 0 | Status: SHIPPED | OUTPATIENT
Start: 2023-02-06 | End: 2023-03-03 | Stop reason: SDUPTHER

## 2023-02-10 ENCOUNTER — OFFICE VISIT (OUTPATIENT)
Dept: INTERNAL MEDICINE | Facility: CLINIC | Age: 73
End: 2023-02-10
Payer: MEDICARE

## 2023-02-10 VITALS
WEIGHT: 182.4 LBS | SYSTOLIC BLOOD PRESSURE: 137 MMHG | OXYGEN SATURATION: 94 % | TEMPERATURE: 97.5 F | BODY MASS INDEX: 30.39 KG/M2 | HEART RATE: 84 BPM | HEIGHT: 65 IN | DIASTOLIC BLOOD PRESSURE: 82 MMHG

## 2023-02-10 DIAGNOSIS — H81.01 MENIERE DISEASE, RIGHT: ICD-10-CM

## 2023-02-10 DIAGNOSIS — R74.8 ELEVATED LIVER ENZYMES: ICD-10-CM

## 2023-02-10 DIAGNOSIS — Z99.89 OSA ON CPAP: ICD-10-CM

## 2023-02-10 DIAGNOSIS — G62.9 PERIPHERAL POLYNEUROPATHY: ICD-10-CM

## 2023-02-10 DIAGNOSIS — E66.09 CLASS 1 OBESITY DUE TO EXCESS CALORIES WITH SERIOUS COMORBIDITY AND BODY MASS INDEX (BMI) OF 31.0 TO 31.9 IN ADULT: Primary | ICD-10-CM

## 2023-02-10 DIAGNOSIS — E78.2 MIXED HYPERLIPIDEMIA: ICD-10-CM

## 2023-02-10 DIAGNOSIS — R73.01 IFG (IMPAIRED FASTING GLUCOSE): ICD-10-CM

## 2023-02-10 DIAGNOSIS — E55.9 VITAMIN D DEFICIENCY: ICD-10-CM

## 2023-02-10 DIAGNOSIS — G47.33 OSA ON CPAP: ICD-10-CM

## 2023-02-10 DIAGNOSIS — F41.1 ANXIETY, GENERALIZED: ICD-10-CM

## 2023-02-10 DIAGNOSIS — M54.50 BACK PAIN AT L4-L5 LEVEL: ICD-10-CM

## 2023-02-10 PROCEDURE — 99214 OFFICE O/P EST MOD 30 MIN: CPT | Performed by: INTERNAL MEDICINE

## 2023-02-10 NOTE — PROGRESS NOTES
"Answers for HPI/ROS submitted by the patient on 2/3/2023  What is the primary reason for your visit?: Physical    CHIEF COMPLAINT/ HPI:  Hyperlipidemia and Follow-up, and with weight loss efforts, recent addition of Wellbutrin medications to her weight loss regimen, to follow-up with her lab work anxiety,      Objective   Vital Signs  Vitals:    02/10/23 0849   BP: 137/82   Pulse: 84   Temp: 97.5 °F (36.4 °C)   SpO2: 94%   Weight: 82.7 kg (182 lb 6.4 oz)   Height: 165.1 cm (65\")      Body mass index is 30.35 kg/m².  Review of Systems   Constitutional: Negative.    HENT: Negative.    Eyes: Negative.    Respiratory: Negative.    Cardiovascular: Negative.    Gastrointestinal: Negative.    Endocrine: Negative.    Genitourinary: Negative.    Musculoskeletal: Negative.    Allergic/Immunologic: Negative.    Neurological: Negative.    Hematological: Negative.    Psychiatric/Behavioral: Negative.       Physical Exam  Constitutional:       General: She is not in acute distress.     Appearance: Normal appearance.   HENT:      Head: Normocephalic.      Mouth/Throat:      Mouth: Mucous membranes are moist.   Eyes:      Conjunctiva/sclera: Conjunctivae normal.      Pupils: Pupils are equal, round, and reactive to light.   Cardiovascular:      Rate and Rhythm: Normal rate and regular rhythm.      Pulses: Normal pulses.      Heart sounds: Normal heart sounds.   Pulmonary:      Effort: Pulmonary effort is normal.      Breath sounds: Normal breath sounds.   Abdominal:      General: Abdomen is flat. Bowel sounds are normal.      Palpations: Abdomen is soft.   Musculoskeletal:         General: No swelling. Normal range of motion.      Cervical back: Neck supple.   Skin:     General: Skin is warm and dry.      Coloration: Skin is not jaundiced.   Neurological:      General: No focal deficit present.      Mental Status: She is alert and oriented to person, place, and time. Mental status is at baseline.   Psychiatric:         Mood and " Affect: Mood normal.         Behavior: Behavior normal.         Thought Content: Thought content normal.         Judgment: Judgment normal.        Result Review :   No results found for: PROBNP, BNP  CMP    CMP 8/1/22 11/9/22 2/2/23   Glucose 99 84 109 (A)   BUN 15 16 16   Creatinine 0.81 0.96 0.98   eGFR 78.2 63.8 61.8   Sodium 145 140 138   Potassium 4.3 4.5 4.2   Chloride 108 (A) 103 102   Calcium 9.0 9.8 9.5   Total Protein 6.0 6.5 6.4   Albumin 3.90 3.90 4.2   Globulin 2.1 2.6 2.2   Total Bilirubin 0.3 0.4 0.4   Alkaline Phosphatase 77 86 92   AST (SGOT) 24 25 26   ALT (SGPT) 28 26 32   Albumin/Globulin Ratio 1.9 1.5 1.9   BUN/Creatinine Ratio 18.5 16.7 16.3   Anion Gap 9.7 11.3 9.0   (A) Abnormal value       Comments are available for some flowsheets but are not being displayed.           CBC w/diff    CBC w/Diff 2/2/23   WBC 7.54   RBC 4.85   Hemoglobin 14.6   Hematocrit 44.1   MCV 90.9   MCH 30.1   MCHC 33.1   RDW 12.2 (A)   Platelets 291   Neutrophil Rel % 69.5   Immature Granulocyte Rel % 0.4   Lymphocyte Rel % 17.5 (A)   Monocyte Rel % 8.6   Eosinophil Rel % 3.2   Basophil Rel % 0.8   (A) Abnormal value             Lipid Panel    Lipid Panel 8/1/22 2/2/23   Total Cholesterol 201 (A) 176   Triglycerides 153 (A) 118   HDL Cholesterol 39 (A) 41   VLDL Cholesterol 28 21   LDL Cholesterol  134 (A) 114 (A)   LDL/HDL Ratio 3.37 2.72   (A) Abnormal value             Lab Results   Component Value Date    TSH 2.550 08/10/2020    TSH 2.230 10/14/2019      No results found for: FREET4   A1C Last 3 Results    HGBA1C Last 3 Results 8/1/22 11/9/22 2/2/23   Hemoglobin A1C 5.90 (A) 5.70 (A) 5.70 (A)   (A) Abnormal value                              Visit Diagnoses:    ICD-10-CM ICD-9-CM   1. Class 1 obesity due to excess calories with serious comorbidity and body mass index (BMI) of 31.0 to 31.9 in adult  E66.09 278.00    Z68.31 V85.31   2. Back pain at L4-L5 level  M54.50 724.2   3. Vitamin D deficiency  E55.9 268.9   4.  Meniere disease, right  H81.01 386.00   5. IFG (impaired fasting glucose)  R73.01 790.21   6. Elevated liver enzymes  R74.8 790.5   7. INDIRA on CPAP  G47.33 327.23    Z99.89 V46.8   8. Mixed hyperlipidemia  E78.2 272.2   9. Peripheral polyneuropathy  G62.9 356.9   10. Anxiety, generalized  F41.1 300.02       Assessment and Plan   Diagnoses and all orders for this visit:    1. Class 1 obesity due to excess calories with serious comorbidity and body mass index (BMI) of 31.0 to 31.9 in adult (Primary)  -     Comprehensive Metabolic Panel; Future  -     Hemoglobin A1c; Future    2. Back pain at L4-L5 level  -     Comprehensive Metabolic Panel; Future  -     Hemoglobin A1c; Future    3. Vitamin D deficiency  -     Comprehensive Metabolic Panel; Future  -     Hemoglobin A1c; Future    4. Meniere disease, right  -     Comprehensive Metabolic Panel; Future  -     Hemoglobin A1c; Future    5. IFG (impaired fasting glucose)  -     Comprehensive Metabolic Panel; Future  -     Hemoglobin A1c; Future    6. Elevated liver enzymes  -     Comprehensive Metabolic Panel; Future  -     Hemoglobin A1c; Future    7. INDIRA on CPAP  -     Comprehensive Metabolic Panel; Future  -     Hemoglobin A1c; Future    8. Mixed hyperlipidemia  -     Comprehensive Metabolic Panel; Future  -     Hemoglobin A1c; Future    9. Peripheral polyneuropathy  -     Comprehensive Metabolic Panel; Future  -     Hemoglobin A1c; Future    10. Anxiety, generalized  -     Comprehensive Metabolic Panel; Future  -     Hemoglobin A1c; Future    overwgt issues---wgt rx with ozempic vs wellbutrin discussed aug 9, 2022, , triied trulicity but cost became issue and she stopped it,,   continues Wellbutrin 150 XL daily, Trulicity 1.5 mg weekly subcu injection    Mixed hyperlipidemia     Diabetes type 2, ---hemoglobin A1c excellent, 5.7 February 2, 2023    Obstructive sleep apnea continues on CPAP machine, previous sleep study November 21, 2019 tolerating well,    Anxiety,  continues on citalopram 20 mg daily    Ménière syndrome, continues Dyazide,--continues on gabapentin 300 nightly and 100 twice daily,     elevated liver enzymes, previous work-up March 2018 all negative ultrasound the liver showed some fatty replacement    Vitamin D deficiency previously, continue vitamin D, aerobic and weightbearing exercises, August 9, 2022    Bone density August 1, 2022 shows osteopenia of the lumbar and hip area, T-scores of  Neg 0.8 and 1.0 ,    Colonoscopy polyps present-- Dr. Belcher in November 2019 follow-up again in 2022, patient will call the office and schedule    dexa scan August 1, 2022, osteopenia lumbar spine and hips, T-scores of -1.9 and -1    Follow Up   Return in about 6 months (around 8/10/2023).  Patient was given instructions and counseling regarding her condition or for health maintenance advice. Please see specific information pulled into the AVS if appropriate.

## 2023-03-03 RX ORDER — BUPROPION HYDROCHLORIDE 150 MG/1
150 TABLET ORAL DAILY
Qty: 30 TABLET | Refills: 0 | Status: SHIPPED | OUTPATIENT
Start: 2023-03-03

## 2023-04-05 ENCOUNTER — PREP FOR SURGERY (OUTPATIENT)
Dept: OTHER | Facility: HOSPITAL | Age: 73
End: 2023-04-05
Payer: MEDICARE

## 2023-04-05 ENCOUNTER — CLINICAL SUPPORT (OUTPATIENT)
Dept: GASTROENTEROLOGY | Facility: CLINIC | Age: 73
End: 2023-04-05
Payer: MEDICARE

## 2023-04-05 DIAGNOSIS — Z12.11 COLON CANCER SCREENING: Primary | ICD-10-CM

## 2023-04-05 RX ORDER — SODIUM, POTASSIUM,MAG SULFATES 17.5-3.13G
1 SOLUTION, RECONSTITUTED, ORAL ORAL EVERY 12 HOURS
Qty: 354 ML | Refills: 0 | Status: SHIPPED | OUTPATIENT
Start: 2023-04-05

## 2023-04-05 NOTE — PROGRESS NOTES
Alison Mackenzie  1950  72 y.o.    Reason for call: Screening Colonoscopy  Prep prescribed: Suprep  Prep instructions reviewed with patient and sent to patient via [employer]  Clearance needed? No  If yes, what clearance is needed? N/A  Clearance has been requested from NA  The patient has been scheduled for: Colonoscopy  Family history of colon cancer? No  If yes, indicate relative: na  Family History   Problem Relation Age of Onset   • Kidney cancer Father 70   • Cancer Father         Renal cell   • Depression Mother    • Anxiety disorder Mother    • Hearing loss Mother      Past Medical History:   Diagnosis Date   • Depression    • Middle ear effusion, bilateral    • Obesity      Allergies   Allergen Reactions   • Bactrim Ds [Sulfamethoxazole-Trimethoprim] Unknown - High Severity     Past Surgical History:   Procedure Laterality Date   • COLONOSCOPY      15+ years ago   • CYSTOSCOPY     • HYSTERECTOMY       Social History     Socioeconomic History   • Marital status:    Tobacco Use   • Smoking status: Never   • Smokeless tobacco: Never   Vaping Use   • Vaping Use: Never used   Substance and Sexual Activity   • Alcohol use: Never   • Drug use: Never   • Sexual activity: Not Currently     Partners: Male     Birth control/protection: Hysterectomy     Comment: Hysterectomy       Current Outpatient Medications:   •  buPROPion XL (Wellbutrin XL) 150 MG 24 hr tablet, Take 1 tablet by mouth Daily., Disp: 30 tablet, Rfl: 0  •  cholecalciferol (VITAMIN D3) 25 MCG (1000 UT) tablet, , Disp: , Rfl:   •  citalopram (CeleXA) 20 MG tablet, TAKE 1 TABLET BY MOUTH EVERY DAY, Disp: 90 tablet, Rfl: 3  •  gabapentin (NEURONTIN) 100 MG capsule, TAKE 1 CAPSULE BY MOUTH TWICE A DAY, Disp: 180 capsule, Rfl: 1  •  gabapentin (NEURONTIN) 300 MG capsule, TAKE 1 CAPSULE BY MOUTH EVERY NIGHT AT BEDTIME., Disp: 90 capsule, Rfl: 1  •  triamterene-hydrochlorothiazide (DYAZIDE) 37.5-25 MG per capsule, TAKE 1 CAPSULE BY MOUTH EVERY  DAY, Disp: 90 capsule, Rfl: 3    Answers for HPI/ROS submitted by the patient on 3/30/2023  Please describe your symptoms.: Follow up  Have you had these symptoms before?: No  How long have you been having these symptoms?: 1-4 days  Please list any medications you are currently taking for this condition.: None  Please describe any probable cause for these symptoms. : None  What is the primary reason for your visit?: Other

## 2023-04-27 PROCEDURE — 87081 CULTURE SCREEN ONLY: CPT | Performed by: STUDENT IN AN ORGANIZED HEALTH CARE EDUCATION/TRAINING PROGRAM

## 2023-04-29 ENCOUNTER — TELEPHONE (OUTPATIENT)
Dept: URGENT CARE | Facility: CLINIC | Age: 73
End: 2023-04-29
Payer: MEDICARE

## 2023-04-29 NOTE — TELEPHONE ENCOUNTER
----- Message from CARLOTTA Bonner sent at 4/29/2023 10:48 AM EDT -----  Please call the patient regarding her normal result.    Please inform patient that her backup beta strep throat culture is negative.    If patient continues to have symptoms or other concerns persist please have her to follow-up with her primary care provider which is listed as Dr. Watson Johansen.

## 2023-06-13 DIAGNOSIS — G62.9 PERIPHERAL POLYNEUROPATHY: ICD-10-CM

## 2023-06-14 RX ORDER — GABAPENTIN 300 MG/1
300 CAPSULE ORAL
Qty: 90 CAPSULE | Refills: 5 | Status: SHIPPED | OUTPATIENT
Start: 2023-06-14

## 2023-06-14 RX ORDER — GABAPENTIN 100 MG/1
CAPSULE ORAL
Qty: 180 CAPSULE | Refills: 5 | Status: SHIPPED | OUTPATIENT
Start: 2023-06-14

## 2023-06-19 ENCOUNTER — TELEPHONE (OUTPATIENT)
Dept: GASTROENTEROLOGY | Facility: CLINIC | Age: 73
End: 2023-06-19
Payer: MEDICARE

## 2023-06-19 NOTE — TELEPHONE ENCOUNTER
I have called and spoke to patient with an upcoming procedure reminder. Patient confirmed.   Instructions sent via [employer].

## 2023-07-25 ENCOUNTER — TRANSCRIBE ORDERS (OUTPATIENT)
Dept: ADMINISTRATIVE | Facility: HOSPITAL | Age: 73
End: 2023-07-25
Payer: MEDICARE

## 2023-07-25 DIAGNOSIS — Z12.31 VISIT FOR SCREENING MAMMOGRAM: Primary | ICD-10-CM

## 2023-08-09 ENCOUNTER — LAB (OUTPATIENT)
Dept: LAB | Facility: HOSPITAL | Age: 73
End: 2023-08-09
Payer: MEDICARE

## 2023-08-09 DIAGNOSIS — Z99.89 OSA ON CPAP: ICD-10-CM

## 2023-08-09 DIAGNOSIS — H81.01 MENIERE DISEASE, RIGHT: ICD-10-CM

## 2023-08-09 DIAGNOSIS — G62.9 PERIPHERAL POLYNEUROPATHY: ICD-10-CM

## 2023-08-09 DIAGNOSIS — M54.50 BACK PAIN AT L4-L5 LEVEL: ICD-10-CM

## 2023-08-09 DIAGNOSIS — R73.01 IFG (IMPAIRED FASTING GLUCOSE): ICD-10-CM

## 2023-08-09 DIAGNOSIS — G47.33 OSA ON CPAP: ICD-10-CM

## 2023-08-09 DIAGNOSIS — E55.9 VITAMIN D DEFICIENCY: ICD-10-CM

## 2023-08-09 DIAGNOSIS — R74.8 ELEVATED LIVER ENZYMES: ICD-10-CM

## 2023-08-09 DIAGNOSIS — E78.2 MIXED HYPERLIPIDEMIA: ICD-10-CM

## 2023-08-09 DIAGNOSIS — F41.1 ANXIETY, GENERALIZED: ICD-10-CM

## 2023-08-09 DIAGNOSIS — E66.09 CLASS 1 OBESITY DUE TO EXCESS CALORIES WITH SERIOUS COMORBIDITY AND BODY MASS INDEX (BMI) OF 31.0 TO 31.9 IN ADULT: ICD-10-CM

## 2023-08-09 LAB
ALBUMIN SERPL-MCNC: 4 G/DL (ref 3.5–5.2)
ALBUMIN/GLOB SERPL: 1.7 G/DL
ALP SERPL-CCNC: 76 U/L (ref 39–117)
ALT SERPL W P-5'-P-CCNC: 29 U/L (ref 1–33)
ANION GAP SERPL CALCULATED.3IONS-SCNC: 9.2 MMOL/L (ref 5–15)
AST SERPL-CCNC: 26 U/L (ref 1–32)
BILIRUB SERPL-MCNC: 0.4 MG/DL (ref 0–1.2)
BUN SERPL-MCNC: 22 MG/DL (ref 8–23)
BUN/CREAT SERPL: 21.8 (ref 7–25)
CALCIUM SPEC-SCNC: 9.7 MG/DL (ref 8.6–10.5)
CHLORIDE SERPL-SCNC: 103 MMOL/L (ref 98–107)
CO2 SERPL-SCNC: 29.8 MMOL/L (ref 22–29)
CREAT SERPL-MCNC: 1.01 MG/DL (ref 0.57–1)
EGFRCR SERPLBLD CKD-EPI 2021: 59.6 ML/MIN/1.73
GLOBULIN UR ELPH-MCNC: 2.4 GM/DL
GLUCOSE SERPL-MCNC: 93 MG/DL (ref 65–99)
HBA1C MFR BLD: 6.2 % (ref 4.8–5.6)
POTASSIUM SERPL-SCNC: 4.4 MMOL/L (ref 3.5–5.2)
PROT SERPL-MCNC: 6.4 G/DL (ref 6–8.5)
SODIUM SERPL-SCNC: 142 MMOL/L (ref 136–145)

## 2023-08-09 PROCEDURE — 80053 COMPREHEN METABOLIC PANEL: CPT

## 2023-08-09 PROCEDURE — 36415 COLL VENOUS BLD VENIPUNCTURE: CPT

## 2023-08-09 PROCEDURE — 83036 HEMOGLOBIN GLYCOSYLATED A1C: CPT

## 2023-08-10 ENCOUNTER — OFFICE VISIT (OUTPATIENT)
Dept: INTERNAL MEDICINE | Facility: CLINIC | Age: 73
End: 2023-08-10
Payer: MEDICARE

## 2023-08-10 VITALS
SYSTOLIC BLOOD PRESSURE: 152 MMHG | HEIGHT: 65 IN | OXYGEN SATURATION: 94 % | HEART RATE: 88 BPM | BODY MASS INDEX: 33.09 KG/M2 | DIASTOLIC BLOOD PRESSURE: 90 MMHG | TEMPERATURE: 97.8 F | WEIGHT: 198.6 LBS

## 2023-08-10 DIAGNOSIS — E55.9 VITAMIN D DEFICIENCY: ICD-10-CM

## 2023-08-10 DIAGNOSIS — G62.9 PERIPHERAL POLYNEUROPATHY: ICD-10-CM

## 2023-08-10 DIAGNOSIS — R74.8 ELEVATED LIVER ENZYMES: ICD-10-CM

## 2023-08-10 DIAGNOSIS — G47.33 OSA ON CPAP: ICD-10-CM

## 2023-08-10 DIAGNOSIS — M81.0 OSTEOPOROSIS, UNSPECIFIED OSTEOPOROSIS TYPE, UNSPECIFIED PATHOLOGICAL FRACTURE PRESENCE: ICD-10-CM

## 2023-08-10 DIAGNOSIS — E66.09 CLASS 1 OBESITY DUE TO EXCESS CALORIES WITH SERIOUS COMORBIDITY AND BODY MASS INDEX (BMI) OF 31.0 TO 31.9 IN ADULT: ICD-10-CM

## 2023-08-10 DIAGNOSIS — R73.01 IFG (IMPAIRED FASTING GLUCOSE): Primary | ICD-10-CM

## 2023-08-10 DIAGNOSIS — M25.562 ACUTE PAIN OF LEFT KNEE: ICD-10-CM

## 2023-08-10 DIAGNOSIS — F41.1 ANXIETY, GENERALIZED: ICD-10-CM

## 2023-08-10 DIAGNOSIS — H81.01 MENIERE DISEASE, RIGHT: ICD-10-CM

## 2023-08-10 DIAGNOSIS — Z99.89 OSA ON CPAP: ICD-10-CM

## 2023-08-10 DIAGNOSIS — E78.2 MIXED HYPERLIPIDEMIA: ICD-10-CM

## 2023-08-10 PROCEDURE — 99214 OFFICE O/P EST MOD 30 MIN: CPT | Performed by: INTERNAL MEDICINE

## 2023-08-10 PROCEDURE — 1170F FXNL STATUS ASSESSED: CPT | Performed by: INTERNAL MEDICINE

## 2023-08-10 NOTE — PROGRESS NOTES
"CHIEF COMPLAINT/ HPI:  Hyperlipidemia and Follow-up (Patient is here for a routine 6 month follow up)    Had a fall several months ago left knee injury, has been to orthopedics has had cortisone shots, she is here to follow-up with her routine labs also, says she is doing well otherwise,          Objective   Vital Signs  Vitals:    08/10/23 0948   BP: 152/90   Pulse: 88   Temp: 97.8 øF (36.6 øC)   SpO2: 94%   Weight: 90.1 kg (198 lb 9.6 oz)   Height: 165.1 cm (65\")      Body mass index is 33.05 kg/mý.  Review of Systems   Constitutional: Negative.    HENT: Negative.     Eyes: Negative.    Respiratory: Negative.     Cardiovascular: Negative.    Gastrointestinal: Negative.    Endocrine: Negative.    Genitourinary: Negative.    Musculoskeletal: Negative.    Allergic/Immunologic: Negative.    Neurological: Negative.    Hematological: Negative.    Psychiatric/Behavioral: Negative.      Physical Exam  Constitutional:       General: She is not in acute distress.     Appearance: Normal appearance. She is obese.   HENT:      Head: Normocephalic.      Mouth/Throat:      Mouth: Mucous membranes are moist.   Eyes:      Conjunctiva/sclera: Conjunctivae normal.      Pupils: Pupils are equal, round, and reactive to light.   Cardiovascular:      Rate and Rhythm: Normal rate and regular rhythm.      Pulses: Normal pulses.      Heart sounds: Normal heart sounds.   Pulmonary:      Effort: Pulmonary effort is normal.      Breath sounds: Normal breath sounds.   Abdominal:      General: Bowel sounds are normal.      Palpations: Abdomen is soft.   Musculoskeletal:         General: Tenderness present. No swelling. Normal range of motion.      Cervical back: Neck supple.   Skin:     General: Skin is warm and dry.      Coloration: Skin is not jaundiced.   Neurological:      General: No focal deficit present.      Mental Status: She is alert and oriented to person, place, and time. Mental status is at baseline.   Psychiatric:         Mood and " Affect: Mood normal.         Behavior: Behavior normal.         Thought Content: Thought content normal.         Judgment: Judgment normal.      Result Review :   No results found for: PROBNP, BNP  CMP          11/9/2022    08:59 2/2/2023    08:59 8/9/2023    08:23   CMP   Glucose 84  109  93    BUN 16  16  22    Creatinine 0.96  0.98  1.01    EGFR 63.8  61.8  59.6    Sodium 140  138  142    Potassium 4.5  4.2  4.4    Chloride 103  102  103    Calcium 9.8  9.5  9.7    Total Protein 6.5  6.4  6.4    Albumin 3.90  4.2  4.0    Globulin 2.6  2.2  2.4    Total Bilirubin 0.4  0.4  0.4    Alkaline Phosphatase 86  92  76    AST (SGOT) 25  26  26    ALT (SGPT) 26  32  29    Albumin/Globulin Ratio 1.5  1.9  1.7    BUN/Creatinine Ratio 16.7  16.3  21.8    Anion Gap 11.3  9.0  9.2      CBC w/diff          2/2/2023    08:59   CBC w/Diff   WBC 7.54    RBC 4.85    Hemoglobin 14.6    Hematocrit 44.1    MCV 90.9    MCH 30.1    MCHC 33.1    RDW 12.2    Platelets 291    Neutrophil Rel % 69.5    Immature Granulocyte Rel % 0.4    Lymphocyte Rel % 17.5    Monocyte Rel % 8.6    Eosinophil Rel % 3.2    Basophil Rel % 0.8       Lipid Panel          2/2/2023    08:59   Lipid Panel   Total Cholesterol 176    Triglycerides 118    HDL Cholesterol 41    VLDL Cholesterol 21    LDL Cholesterol  114    LDL/HDL Ratio 2.72       Lab Results   Component Value Date    TSH 2.550 08/10/2020    TSH 2.230 10/14/2019      No results found for: FREET4   A1C Last 3 Results          11/9/2022    08:59 2/2/2023    08:59 8/9/2023    08:23   HGBA1C Last 3 Results   Hemoglobin A1C 5.70  5.70  6.20                        Visit Diagnoses:    ICD-10-CM ICD-9-CM   1. Peripheral polyneuropathy  G62.9 356.9   2. Acute pain of left knee  M25.562 719.46   3. Osteoporosis, unspecified osteoporosis type, unspecified pathological fracture presence  M81.0 733.00   4. INDIRA on CPAP  G47.33 327.23    Z99.89 V46.8   5. Anxiety, generalized  F41.1 300.02   6. Elevated liver enzymes   R74.8 790.5   7. Vitamin D deficiency  E55.9 268.9   8. Meniere disease, right  H81.01 386.00   9. Mixed hyperlipidemia  E78.2 272.2   10. Class 1 obesity due to excess calories with serious comorbidity and body mass index (BMI) of 31.0 to 31.9 in adult  E66.09 278.00    Z68.31 V85.31   11. IFG (impaired fasting glucose)  R73.01 790.21       Assessment and Plan   Diagnoses and all orders for this visit:    1. Peripheral polyneuropathy (Primary)  -     Comprehensive Metabolic Panel; Future  -     CBC & Differential; Future  -     Lipid Panel; Future  -     Hemoglobin A1c; Future    2. Acute pain of left knee  -     Comprehensive Metabolic Panel; Future  -     CBC & Differential; Future  -     Lipid Panel; Future  -     Ambulatory Referral to Physical Therapy Evaluate and treat  -     Hemoglobin A1c; Future    3. Osteoporosis, unspecified osteoporosis type, unspecified pathological fracture presence  -     Comprehensive Metabolic Panel; Future  -     CBC & Differential; Future  -     Lipid Panel; Future  -     Hemoglobin A1c; Future    4. INDIRA on CPAP  -     Comprehensive Metabolic Panel; Future  -     CBC & Differential; Future  -     Lipid Panel; Future  -     Hemoglobin A1c; Future    5. Anxiety, generalized  -     Comprehensive Metabolic Panel; Future  -     CBC & Differential; Future  -     Lipid Panel; Future  -     Hemoglobin A1c; Future    6. Elevated liver enzymes  -     Comprehensive Metabolic Panel; Future  -     CBC & Differential; Future  -     Lipid Panel; Future  -     Hemoglobin A1c; Future    7. Vitamin D deficiency  -     Comprehensive Metabolic Panel; Future  -     CBC & Differential; Future  -     Lipid Panel; Future  -     Hemoglobin A1c; Future    8. Meniere disease, right  -     Comprehensive Metabolic Panel; Future  -     CBC & Differential; Future  -     Lipid Panel; Future  -     Hemoglobin A1c; Future    9. Mixed hyperlipidemia  -     Comprehensive Metabolic Panel; Future  -     CBC &  Differential; Future  -     Lipid Panel; Future  -     Hemoglobin A1c; Future    10. Class 1 obesity due to excess calories with serious comorbidity and body mass index (BMI) of 31.0 to 31.9 in adult  -     Comprehensive Metabolic Panel; Future  -     CBC & Differential; Future  -     Lipid Panel; Future  -     Hemoglobin A1c; Future    11. IFG (impaired fasting glucose)  -     Comprehensive Metabolic Panel; Future  -     CBC & Differential; Future  -     Lipid Panel; Future  -     Hemoglobin A1c; Future      Acute knee pain, patient's been to orthopedics for cortisone shots, and is wearing a brace at times,, continues to have anterior knee pain patellar pain especially with walking, August 2023, continues on diclofenac    overwgt issues---triied trulicity but cost became issue and she stopped it,,   off  Wellbutrin 150 XL daily,     Diabetes type 2, ---hemoglobin A1c -6.2--currently no treatment will follow closely,    Obstructive sleep apnea continues on CPAP machine, previous sleep study November 21, 2019 tolerating well,    Anxiety, continues  citalopram 20 mg daily    M‚niŠre syndrome, continues Dyazide, gabapentin 300 nightly and 100 twice daily,     elevated liver enzymes, previous work-up March 2018 all negative ultrasound the liver showed some fatty replacement    Vitamin D deficiency previously, continue vitamin D, aerobic and weightbearing exercises, August 9, 2022    Bone density August 1, 2022 shows osteopenia of the lumbar and hip area, T-scores of  Neg 0.8 and 1.0 ,    Colonoscopy polyps present-- Dr. Belcher in November 2019 -July 3, 2023 colonoscopy, patient had multiple diverticuli but no polyps, otherwise unremarkable    dexa scan August 1, 2022, osteopenia lumbar spine and hips, T-scores of -1.9 and -1      Follow Up   Return in about 6 months (around 2/10/2024).  Patient was given instructions and counseling regarding her condition or for health maintenance advice. Please see specific  information pulled into the AVS if appropriate.       Answers submitted by the patient for this visit:  Primary Reason for Visit (Submitted on 8/7/2023)  What is the primary reason for your visit?: Physical

## 2023-08-13 DIAGNOSIS — M17.12 ARTHRITIS OF LEFT KNEE: ICD-10-CM

## 2023-08-14 ENCOUNTER — OFFICE VISIT (OUTPATIENT)
Dept: ORTHOPEDIC SURGERY | Facility: CLINIC | Age: 73
End: 2023-08-14
Payer: MEDICARE

## 2023-08-14 VITALS
BODY MASS INDEX: 33.15 KG/M2 | DIASTOLIC BLOOD PRESSURE: 84 MMHG | OXYGEN SATURATION: 93 % | HEIGHT: 65 IN | HEART RATE: 78 BPM | WEIGHT: 199 LBS | SYSTOLIC BLOOD PRESSURE: 139 MMHG

## 2023-08-14 DIAGNOSIS — M17.12 ARTHRITIS OF LEFT KNEE: Primary | ICD-10-CM

## 2023-08-14 NOTE — PROGRESS NOTES
"Chief Complaint  Follow-up of the Left Knee    Subjective          Alison Mackenzie presents to [facility] for   History of Present Illness    The patient presents here today for follow up evaluation of the left knee. The patient has been treating her left knee osteoarthritis conservatively. She had a steroid flare after the injection that improved with diclofenac and diclofenac gel. She is still having some pain.     Allergies   Allergen Reactions    Bactrim Ds [Sulfamethoxazole-Trimethoprim] Unknown - High Severity        Social History     Socioeconomic History    Marital status:    Tobacco Use    Smoking status: Never    Smokeless tobacco: Never   Vaping Use    Vaping Use: Never used   Substance and Sexual Activity    Alcohol use: Never    Drug use: Never    Sexual activity: Not Currently     Partners: Male     Birth control/protection: Post-menopausal, Hysterectomy     Comment: Hysterectomy        I reviewed the patient's chief complaint, history of present illness, review of systems, past medical history, surgical history, family history, social history, medications, and allergy list.     REVIEW OF SYSTEMS    Constitutional: Denies fevers, chills, weight loss  Cardiovascular: Denies chest pain, shortness of breath  Skin: Denies rashes, acute skin changes  Neurologic: Denies headache, loss of consciousness  MSK: Left knee pain      Objective   Vital Signs:   /84   Pulse 78   Ht 165.1 cm (65\")   Wt 90.3 kg (199 lb)   SpO2 93%   BMI 33.12 kg/mý     Body mass index is 33.12 kg/mý.    Physical Exam    General: Alert. No acute distress.   Left knee- Tenderness to the medial and lateral joint lines. Full knee extension. Knee flexion 120. Knee extensor mechanism intact. Patellar crepitus with motion. Knee stable to varus and valgus stress. No pain or mechanical symptoms with Chung testing. Stable to anterior and posterior drawer. Calf soft, nontender. Demonstrates " active ankle plantarflexion and dorsiflexion. Sensation intact over dorsal and plantar foot. Palpable pedal pulses.     Procedures    Imaging Results (Most Recent)       None                     Assessment and Plan        No results found.     Diagnoses and all orders for this visit:    1. Arthritis of left knee (Primary)        Discussed the treatment plan with the patient.  Plan to continue conservative treatment at this time. Plan to continue diclofenac and topicals as needed. Plan to continue home exercises.       Call or return if worsening symptoms.    Scribed for Lionel Theodore MD by Rain Wang  08/14/2023   09:40 EDT         Follow Up       3-4 weeks for Visco     Patient was given instructions and counseling regarding her condition or for health maintenance advice. Please see specific information pulled into the AVS if appropriate.       I have personally performed the services described in this document as scribed by the above individual and it is both accurate and complete.     Lionel Theodore MD  08/14/23  09:42 EDT

## 2023-08-17 ENCOUNTER — TELEPHONE (OUTPATIENT)
Dept: ORTHOPEDIC SURGERY | Facility: CLINIC | Age: 73
End: 2023-08-17
Payer: MEDICARE

## 2023-08-17 DIAGNOSIS — M17.12 ARTHRITIS OF LEFT KNEE: Primary | ICD-10-CM

## 2023-08-17 NOTE — TELEPHONE ENCOUNTER
Caller: Alison Mackenzie    Relationship: Self    Best call back number: [phone]    What orders are you requesting (i.e. lab or imaging): PHYSICAL THERAPY    In what timeframe would the patient need to come in: ASAP    Where will you receive your lab/imaging services: [facility] [place] (PHONE: [phone])    Additional notes: PATIENT WENT TO PCP AND WAS REFERRED TO PHYSICAL THERAPY. WHEN SHE WENT TO THE APPT, THE PHYSICAL THERAPIST REQUESTED AN ORDER FROM THE ORTHOPEDIC OFFICE. SHE HAS AN APPT W/ JEFFREY BEY IN 3 WEEKS SHE STATES. SHE WOULD LIKE TO KNOW IF DR. BAUTISTA OR [facility] COULD SEND THIS ORDER FOR HER. PLEASE CALL TO ADVISE.

## 2023-08-28 ENCOUNTER — OFFICE VISIT (OUTPATIENT)
Dept: INTERNAL MEDICINE | Facility: CLINIC | Age: 73
End: 2023-08-28
Payer: MEDICARE

## 2023-08-28 VITALS
HEART RATE: 95 BPM | WEIGHT: 197.6 LBS | DIASTOLIC BLOOD PRESSURE: 76 MMHG | TEMPERATURE: 96.8 F | OXYGEN SATURATION: 97 % | HEIGHT: 65 IN | BODY MASS INDEX: 32.92 KG/M2 | SYSTOLIC BLOOD PRESSURE: 128 MMHG

## 2023-08-28 DIAGNOSIS — R05.1 ACUTE COUGH: ICD-10-CM

## 2023-08-28 DIAGNOSIS — T78.40XS ALLERGY, SEQUELA: ICD-10-CM

## 2023-08-28 DIAGNOSIS — J06.9 UPPER RESPIRATORY TRACT INFECTION, UNSPECIFIED TYPE: Primary | ICD-10-CM

## 2023-08-28 RX ORDER — PREDNISONE 20 MG/1
TABLET ORAL
Qty: 11 TABLET | Refills: 0 | Status: SHIPPED | OUTPATIENT
Start: 2023-08-28

## 2023-08-28 RX ORDER — AZITHROMYCIN 250 MG/1
TABLET, FILM COATED ORAL
Qty: 6 TABLET | Refills: 0 | Status: SHIPPED | OUTPATIENT
Start: 2023-08-28

## 2023-08-28 RX ORDER — HYDROCODONE POLISTIREX AND CHLORPHENIRAMINE POLISTIREX 10; 8 MG/5ML; MG/5ML
5 SUSPENSION, EXTENDED RELEASE ORAL EVERY 12 HOURS PRN
Qty: 60 ML | Refills: 0 | Status: SHIPPED | OUTPATIENT
Start: 2023-08-28

## 2023-08-28 NOTE — PROGRESS NOTES
Chief Complaint  Cough (Pt states this has been going on for about 3 weeks, pt went to Urgent Care and they prescribed cough syrup which pt says did not help and only made her feel worse./)    History of Present Illness  SUBJECTIVE  Alison Mackenzie presents to [facility] with URI symptoms.    Patient complains of congestion, cough, sinus pain, headache, sore throat.  Patient denies fever, soa, wheezing, chills, nausea, vomiting, diarrhea.    Symptom onset-3 weeks    She has been taking OTC allergy medication. She went to urgent care and was given Bromfed and it did not help. She states she felt worse.     She would also like referral to an allergist.    Past Medical History:   Diagnosis Date    Depression     Frozen shoulder 10 yr ago    Physical Therapy And inj treatment    Knee sprain 2wk ago    [facility]    Middle ear effusion, bilateral     Obesity     Sleep apnea       Family History   Problem Relation Age of Onset    Kidney cancer Father 70    Cancer Father         Renal cell    Depression Mother     Anxiety disorder Mother     Hearing loss Mother       Past Surgical History:   Procedure Laterality Date    COLONOSCOPY      15+ years ago    COLONOSCOPY N/A 7/3/2023    Procedure: COLONOSCOPY;  Surgeon: Noni Belcher MD;  Location: [facility] END[facility]COPY;  Service: Gastroenterology;  Laterality: N/A;  DIVERTICULOSIS, HEMORRHOIDS    CYSTOSCOPY      HYSTERECTOMY      UPPER GASTROINTESTINAL ENDOSCOPY          Current Outpatient Medications:     cholecalciferol (VITAMIN D3) 25 MCG (1000 UT) tablet, Take 1 tablet by mouth Daily., Disp: , Rfl:     citalopram (CeleXA) 20 MG tablet, TAKE 1 TABLET BY MOUTH EVERY DAY, Disp: 90 tablet, Rfl: 3    gabapentin (NEURONTIN) 100 MG capsule, TAKE 1 CAPSULE BY MOUTH TWICE A DAY (Patient taking differently: Take 1 capsule by mouth Daily.), Disp: 180 capsule, Rfl: 5    gabapentin (NEURONTIN) 300 MG capsule, TAKE 1 CAPSULE BY MOUTH EVERY  "NIGHT AT BEDTIME., Disp: 90 capsule, Rfl: 5    triamterene-hydrochlorothiazide (DYAZIDE) 37.5-25 MG per capsule, TAKE 1 CAPSULE BY MOUTH EVERY DAY, Disp: 90 capsule, Rfl: 3    azithromycin (Zithromax Z-Benton) 250 MG tablet, Take 2 tablets by mouth on day 1, then 1 tablet daily on days 2-5, Disp: 6 tablet, Rfl: 0    Hydrocod Fredrick-Chlorphe Fredrick ER (TUSSIONEX PENNKINETIC) 10-8 MG/5ML ER suspension, Take 5 mL by mouth Every 12 (Twelve) Hours As Needed for Cough., Disp: 60 mL, Rfl: 0    predniSONE (DELTASONE) 20 MG tablet, Take 2 tablets daily for 3 days, then take 1 tablet daily for 3 days, then take 1/2 tablet daily for 4 days, Disp: 11 tablet, Rfl: 0    OBJECTIVE  Vital Signs:   /76 (BP Location: Left arm, Patient Position: Sitting, Cuff Size: Adult)   Pulse 95   Temp 96.8 øF (36 øC)   Ht 165.1 cm (65\")   Wt 89.6 kg (197 lb 9.6 oz)   SpO2 97%   BMI 32.88 kg/mý    Estimated body mass index is 32.88 kg/mý as calculated from the following:    Height as of this encounter: 165.1 cm (65\").    Weight as of this encounter: 89.6 kg (197 lb 9.6 oz).     Wt Readings from Last 3 Encounters:   08/28/23 89.6 kg (197 lb 9.6 oz)   08/23/23 90.7 kg (200 lb)   08/14/23 90.3 kg (199 lb)     BP Readings from Last 3 Encounters:   08/28/23 128/76   08/23/23 133/73   08/14/23 139/84       Physical Exam  Vitals and nursing note reviewed.   Constitutional:       Appearance: Normal appearance.   HENT:      Head: Normocephalic.      Right Ear: A middle ear effusion is present.      Left Ear: A middle ear effusion is present.      Nose: Congestion present.      Right Sinus: Maxillary sinus tenderness present.      Left Sinus: Maxillary sinus tenderness present.      Mouth/Throat:      Pharynx: Posterior oropharyngeal erythema present.   Eyes:      Extraocular Movements: Extraocular movements intact.      Conjunctiva/sclera: Conjunctivae normal.   Cardiovascular:      Rate and Rhythm: Normal rate and regular rhythm.      Heart sounds: " Normal heart sounds. No murmur heard.  Pulmonary:      Effort: Pulmonary effort is normal. No respiratory distress.      Breath sounds: Normal breath sounds. No wheezing, rhonchi or rales.   Abdominal:      General: Bowel sounds are normal.      Palpations: Abdomen is soft.      Tenderness: There is no abdominal tenderness. There is no guarding.   Musculoskeletal:         General: No swelling. Normal range of motion.   Skin:     General: Skin is warm and dry.   Neurological:      General: No focal deficit present.      Mental Status: She is alert. Mental status is at baseline.   Psychiatric:         Mood and Affect: Mood normal.         Thought Content: Thought content normal.        Result Review        XR Knee 4+ View Left    Result Date: 6/23/2023    1. Radiographic changes of mild osteoarthritis. 2. No acute findings      Raphael Osborn MD       Electronically Signed and Approved By: Raphael Osborn MD on 6/23/2023 at 14:37                The above data has been reviewed by CARLOTTA Riley 08/28/2023 13:05 EDT.          Patient Care Team:  Watson Johansen MD as PCP - General (Internal Medicine)            ASSESSMENT & PLAN    Diagnoses and all orders for this visit:    1. Upper respiratory tract infection, unspecified type (Primary)  -     azithromycin (Zithromax Z-Benton) 250 MG tablet; Take 2 tablets by mouth on day 1, then 1 tablet daily on days 2-5  Dispense: 6 tablet; Refill: 0  -     predniSONE (DELTASONE) 20 MG tablet; Take 2 tablets daily for 3 days, then take 1 tablet daily for 3 days, then take 1/2 tablet daily for 4 days  Dispense: 11 tablet; Refill: 0  -     Hydrocod Fredrick-Chlorphe Fredrick ER (TUSSIONEX PENNKINETIC) 10-8 MG/5ML ER suspension; Take 5 mL by mouth Every 12 (Twelve) Hours As Needed for Cough.  Dispense: 60 mL; Refill: 0    2. Allergy, sequela  -     Cancel: Ambulatory Referral to Allergy  -     Ambulatory Referral to Allergy    3. Acute cough  -     Hydrocod Fredrick-Chlorphe Fredrick ER  (TUSSIONEX PENNKINETIC) 10-8 MG/5ML ER suspension; Take 5 mL by mouth Every 12 (Twelve) Hours As Needed for Cough.  Dispense: 60 mL; Refill: 0       If no improvement, may need chest xray    Tobacco Use: Low Risk     Smoking Tobacco Use: Never    Smokeless Tobacco Use: Never    Passive Exposure: Not on file       Follow Up     Return if symptoms worsen or fail to improve.    Please note that portions of this note were completed with a voice recognition program.    Patient was given instructions and counseling regarding her condition or for health maintenance advice. Please see specific information pulled into the AVS if appropriate.   I have reviewed information obtained and documented by others and I have confirmed the accuracy of this documented note.    CARLOTTA Riley

## 2023-09-06 ENCOUNTER — OFFICE VISIT (OUTPATIENT)
Dept: ORTHOPEDIC SURGERY | Facility: CLINIC | Age: 73
End: 2023-09-06
Payer: MEDICARE

## 2023-09-06 VITALS
BODY MASS INDEX: 32.82 KG/M2 | HEIGHT: 65 IN | DIASTOLIC BLOOD PRESSURE: 89 MMHG | SYSTOLIC BLOOD PRESSURE: 163 MMHG | HEART RATE: 74 BPM | WEIGHT: 197 LBS | OXYGEN SATURATION: 97 %

## 2023-09-06 DIAGNOSIS — M17.12 ARTHRITIS OF LEFT KNEE: Primary | ICD-10-CM

## 2023-09-06 NOTE — PROGRESS NOTES
"Chief Complaint  Follow-up of the Left Knee    Subjective          Alison Mackenzie presents to [facility]   History of Present Illness    Alison Mackenzie presents today for a follow-up of the left knee.  Patient has left knee arthritis that we are treating conservatively.  Today, patient states that she is doing better.  She states that her knee has improved from her recent steroid flare following her cortisone injection.  She has no longer taking diclofenac or using gel.  States that her pain has improved.  She has been attending physical therapy and doing her home exercises.  She reports no more swelling.  She does experience some medial pain and posterior knee tightness at times.  She denies new injuries.  Denies numbness or tingling.      Allergies   Allergen Reactions    Bactrim Ds [Sulfamethoxazole-Trimethoprim] Itching and Rash        Social History     Socioeconomic History    Marital status:    Tobacco Use    Smoking status: Never    Smokeless tobacco: Never   Vaping Use    Vaping Use: Never used   Substance and Sexual Activity    Alcohol use: Never    Drug use: Never    Sexual activity: Not Currently     Partners: Male     Birth control/protection: Post-menopausal, Hysterectomy     Comment: Hysterectomy        I reviewed the patient's chief complaint, history of present illness, review of systems, past medical history, surgical history, family history, social history, medications, and allergy list.     REVIEW OF SYSTEMS    Constitutional: Denies fevers, chills, weight loss  Cardiovascular: Denies chest pain, shortness of breath  Skin: Denies rashes, acute skin changes  Neurologic: Denies headache, loss of consciousness  MSK: Left knee pain      Objective   Vital Signs:   /89   Pulse 74   Ht 165.1 cm (65\")   Wt 89.4 kg (197 lb)   SpO2 97%   BMI 32.78 kg/m²     Body mass index is 32.78 kg/m².    Physical Exam    General: Alert. No acute distress. "   Left lower extremity: Nontender to the medial or lateral joint lines.  No swelling.  Full knee extension.  Knee flexion 120.  Knee extensor mechanism intact.  Knee stable to varus and valgus stress.  No pain with Chung testing.  Calf soft, nontender.  Demonstrates active ankle plantarflexion and dorsiflexion.  Sensation intact over dorsal and plantar foot.  Palpable pedal pulses.    Procedures    Imaging Results (Most Recent)       None                   Assessment and Plan    Diagnoses and all orders for this visit:    1. Arthritis of left knee (Primary)        Alison Mackenzie presents today for follow-up of her left knee arthritis that we are treating conservatively.  Patient instructed to continue with home exercises and finish with formal physical therapy.  Continue with range of motion and strengthening exercises.  Continue with activities as tolerated.  We discussed pain management with ice and elevation as needed.  Okay to take anti-inflammatories as needed.  Use diclofenac gel as needed.      Patient will follow up as needed.       Call or return if symptoms worsen or patient has any concerns.       Follow Up   Return if symptoms worsen or fail to improve.  Patient was given instructions and counseling regarding her condition or for health maintenance advice. Please see specific information pulled into the AVS if appropriate.     Loyda Amato PA-C  09/06/23  10:23 EDT

## 2023-09-11 DIAGNOSIS — M17.12 ARTHRITIS OF LEFT KNEE: ICD-10-CM

## 2023-09-21 ENCOUNTER — TELEPHONE (OUTPATIENT)
Dept: ORTHOPEDIC SURGERY | Facility: CLINIC | Age: 73
End: 2023-09-21
Payer: MEDICARE

## 2023-09-21 DIAGNOSIS — M17.12 ARTHRITIS OF LEFT KNEE: Primary | ICD-10-CM

## 2023-09-21 NOTE — TELEPHONE ENCOUNTER
PLEASE CALL / LEAVE VMAIL NOTIFYING PATIENT IF / WHEN LEFT KNEE MRI IS ORDERED (PATIENT STATED SHE DISCUSSED IT w/DR BAUTISTA AT [facility] BUT NOTHING NOTED IN 08-14-23 OFC NOTES NOR 09-06-23 FOLLOW UP w/[facility])     PATIENT STATED SHE (&or DR BAUTISTA?) WOULD LIKE TO GET LEFT KNEE MRI BEFORE GETTING PRIOR AUTH (IF REQUIRED BY MEDICARE A & B + AARP MEDICARE SUPPLEMENT) AND BEING SCHEDULED FOR LEFT KNEE GEL INJECTIONS     PATIENT GIVEN MAGNOLIA ARTEAGA SCHEDULING #s TO CALL IF / WHEN LEFT KNEE MRI ORDER ENTERED     ALSO INFORMED PATIENT TO CALL BACK ONCE SHE KNOWS MRI DATE TO SCHEDULE FOLLOW UP 3+ BUSINESS DAYS AFTER MRI TO DISCUSS RESULTS     THANKS

## 2023-09-21 NOTE — TELEPHONE ENCOUNTER
PATIENT NOTIFIED MRI ORDERED AND RADIOLOGY SCHEDULING WILL CONTACT HER TO SCHEDULE MRI. PATIENT VERBALIZED UNDERSTANDING.

## 2023-10-10 ENCOUNTER — HOSPITAL ENCOUNTER (OUTPATIENT)
Dept: MAMMOGRAPHY | Facility: HOSPITAL | Age: 73
Discharge: HOME | End: 2023-10-10
Admitting: INTERNAL MEDICINE
Payer: MEDICARE

## 2023-10-10 DIAGNOSIS — Z12.31 VISIT FOR SCREENING MAMMOGRAM: ICD-10-CM

## 2023-10-10 PROCEDURE — 77067 SCR MAMMO BI INCL CAD: CPT

## 2023-10-10 PROCEDURE — 77063 BREAST TOMOSYNTHESIS BI: CPT

## 2023-10-12 ENCOUNTER — HOSPITAL ENCOUNTER (OUTPATIENT)
Dept: GENERAL RADIOLOGY | Facility: HOSPITAL | Age: 73
Discharge: HOME | End: 2023-10-12
Payer: MEDICARE

## 2023-10-12 ENCOUNTER — HOSPITAL ENCOUNTER (OUTPATIENT)
Dept: MRI IMAGING | Facility: HOSPITAL | Age: 73
Discharge: HOME | End: 2023-10-12
Payer: MEDICARE

## 2023-10-12 ENCOUNTER — TRANSCRIBE ORDERS (OUTPATIENT)
Dept: GENERAL RADIOLOGY | Facility: HOSPITAL | Age: 73
End: 2023-10-12
Payer: MEDICARE

## 2023-10-12 DIAGNOSIS — J30.89 PERENNIAL ALLERGIC RHINITIS: ICD-10-CM

## 2023-10-12 DIAGNOSIS — J30.1 ALLERGIC RHINITIS DUE TO POLLEN, UNSPECIFIED SEASONALITY: ICD-10-CM

## 2023-10-12 DIAGNOSIS — M17.12 ARTHRITIS OF LEFT KNEE: ICD-10-CM

## 2023-10-12 DIAGNOSIS — J98.9 RESPIRATORY DISORDER: ICD-10-CM

## 2023-10-12 DIAGNOSIS — J98.9 RESPIRATORY DISORDER: Primary | ICD-10-CM

## 2023-10-12 PROCEDURE — 71046 X-RAY EXAM CHEST 2 VIEWS: CPT

## 2023-10-12 PROCEDURE — 73721 MRI JNT OF LWR EXTRE W/O DYE: CPT

## 2023-10-23 ENCOUNTER — OFFICE VISIT (OUTPATIENT)
Dept: ORTHOPEDIC SURGERY | Facility: CLINIC | Age: 73
End: 2023-10-23
Payer: MEDICARE

## 2023-10-23 VITALS — HEIGHT: 65 IN | BODY MASS INDEX: 32.82 KG/M2 | WEIGHT: 197 LBS

## 2023-10-23 DIAGNOSIS — M17.12 ARTHRITIS OF LEFT KNEE: Primary | ICD-10-CM

## 2023-10-23 DIAGNOSIS — S83.242A ACUTE MEDIAL MENISCUS TEAR OF LEFT KNEE, INITIAL ENCOUNTER: ICD-10-CM

## 2023-10-23 PROCEDURE — 99213 OFFICE O/P EST LOW 20 MIN: CPT | Performed by: PHYSICIAN ASSISTANT

## 2023-10-23 NOTE — PROGRESS NOTES
"Chief Complaint  Pain and Follow-up of the Left Knee    Subjective          Alison Mackenzie presents to [facility]   History of Present Illness    Alison Mackenzie presents today for a follow-up of her left knee.  Patient recently had a left knee MRI and presents today to review the results.  She explains that her knee pain has continued and been worsening.  She reports pain with ambulation, especially with stairs running even ground.  She does note continued swelling to the knee.  States her range of motion is worsening and her strength is not very good.  She experiences catching to her knee on occasion.  She experiences stiffness to her knee.  She is unable to take anti-inflammatories as they irritate her stomach.      Allergies   Allergen Reactions    Bactrim Ds [Sulfamethoxazole-Trimethoprim] Itching and Rash        Social History     Socioeconomic History    Marital status:    Tobacco Use    Smoking status: Never    Smokeless tobacco: Never   Vaping Use    Vaping Use: Never used   Substance and Sexual Activity    Alcohol use: Never    Drug use: Never    Sexual activity: Not Currently     Partners: Male     Birth control/protection: Post-menopausal, Hysterectomy     Comment: Hysterectomy        I reviewed the patient's chief complaint, history of present illness, review of systems, past medical history, surgical history, family history, social history, medications, and allergy list.     REVIEW OF SYSTEMS    Constitutional: Denies fevers, chills, weight loss  Cardiovascular: Denies chest pain, shortness of breath  Skin: Denies rashes, acute skin changes  Neurologic: Denies headache, loss of consciousness  MSK: Left knee pain      Objective   Vital Signs:   Ht 165.1 cm (65\")   Wt 89.4 kg (197 lb)   BMI 32.78 kg/m²     Body mass index is 32.78 kg/m².    Physical Exam    General: Alert. No acute distress.   Left lower extremity: Tenderness medial joint line.  Nontender " to lateral joint line.  Full knee extension.  Knee flexion 120.  Knee extensor mechanism intact.  Knee stable to varus and valgus stress.  No pain or mechanical symptoms with Chung testing.  Calf soft, nontender.  Demonstrates active ankle plantarflexion and dorsiflexion.  Sensation intact over dorsal plantar foot.  Palpable pedal pulses.    Procedures    Imaging Results (Most Recent)       None                   Assessment and Plan    Diagnoses and all orders for this visit:    1. Arthritis of left knee (Primary)    2. Acute medial meniscus tear of left knee, initial encounter        Alison Mackenzie presents today to review her left knee MRI results.  Patient has a medial meniscus tear and arthritic changes.  She has grade IV chondromalacia.  We discussed both conservative and surgical options for her left knee arthritis.  We specifically discussed left total knee arthroplasty as a surgical option.  Patient elected for conservative management at this time.  She is instructed to continue with her home exercises for range of motion and strength.  We will work on prior authorization for left knee gel injection.      Patient will follow up as needed.    Call or return if symptoms worsen or patient has any concerns.       Follow Up   Return if symptoms worsen or fail to improve.  Patient was given instructions and counseling regarding her condition or for health maintenance advice. Please see specific information pulled into the AVS if appropriate.     Loyda Amato PA-C  10/23/23  09:56 EDT

## 2023-10-30 ENCOUNTER — TELEPHONE (OUTPATIENT)
Dept: ORTHOPEDIC SURGERY | Facility: CLINIC | Age: 73
End: 2023-10-30
Payer: MEDICARE

## 2023-10-30 NOTE — TELEPHONE ENCOUNTER
PATIENT CALLED TO CHECK ON STATUS OF GEL INJECTION AUTH.   PATIENT HAS MEDICARE AS PRIMARY INSURANCE AND DOES NOT REQUIRE AUTH FOR GEL INJECTIONS.   CAN YOU PLEASE REACH OUT TO PATIENT TO SCHEDULE LEFT KNEE SYNVISC ONE INJECTION.   NO RECENT INJECTIONS.

## 2023-11-01 ENCOUNTER — OFFICE VISIT (OUTPATIENT)
Dept: ORTHOPEDIC SURGERY | Facility: CLINIC | Age: 73
End: 2023-11-01
Payer: MEDICARE

## 2023-11-01 VITALS
WEIGHT: 198 LBS | SYSTOLIC BLOOD PRESSURE: 116 MMHG | OXYGEN SATURATION: 94 % | HEART RATE: 90 BPM | BODY MASS INDEX: 32.99 KG/M2 | DIASTOLIC BLOOD PRESSURE: 67 MMHG | HEIGHT: 65 IN

## 2023-11-01 DIAGNOSIS — M25.562 LEFT KNEE PAIN, UNSPECIFIED CHRONICITY: ICD-10-CM

## 2023-11-01 DIAGNOSIS — M17.12 OSTEOARTHRITIS OF LEFT KNEE, UNSPECIFIED OSTEOARTHRITIS TYPE: Primary | ICD-10-CM

## 2023-11-01 NOTE — PROGRESS NOTES
"Chief Complaint  Follow-up of the Left Knee    Subjective      Alison Mackenzie presents to [facility] for follow up of her left knee. She has a previous diagnosis of left knee osteoarthritis that we are treating conservatively.  Today, she is here today without the use of assistive devices. She denies any new falls or injuries.  She is taking tylenol arthritis for baseline pain medication and naproxen as needed. She also uses a topical gel.     Allergies   Allergen Reactions    Bactrim Ds [Sulfamethoxazole-Trimethoprim] Itching and Rash       Objective     Vital Signs:   Vitals:    11/01/23 1320   BP: 116/67   Pulse: 90   SpO2: 94%   Weight: 89.8 kg (198 lb)   Height: 165.1 cm (65\")     Body mass index is 32.95 kg/m².    I reviewed the patient's chief complaint, history of present illness, review of systems, past medical history, surgical history, family history, social history, medications, and allergy list.     REVIEW OF SYSTEMS    Constitutional: Denies fevers, chills, weight loss  Cardiovascular: Denies chest pain, shortness of breath  Skin: Denies rashes, acute skin changes  Neurologic: Denies headache, loss of consciousness  MSK: Left knee pain.     Ortho Exam   General: Alert, no acute distress.   Left lower extremity:  Tenderness medial joint line.  Nontender to lateral joint line.  Full knee extension.  Knee flexion 120.  Knee extensor mechanism intact.  Knee stable to varus and valgus stress.  No pain or mechanical symptoms with Chung testing.  Calf soft, nontender.  Demonstrates active ankle plantarflexion and dorsiflexion.  Sensation intact over dorsal plantar foot.  Palpable pedal pulses.     Large Joint Arthrocentesis: L knee  Date/Time: 11/1/2023 2:07 PM  Consent given by: patient  Site marked: site marked  Timeout: Immediately prior to procedure a time out was called to verify the correct patient, procedure, equipment, support staff and site/side marked as " required   Supporting Documentation  Indications: pain   Procedure Details  Location: knee - L knee  Needle gauge: 21G.  Medications administered: 48 mg hylan 48 MG/6ML  Patient tolerance: patient tolerated the procedure well with no immediate complications           Imaging Results (Most Recent)       None             MRI Knee Left Without Contrast  Narrative: PROCEDURE: MRI KNEE LEFT  WO CONTRAST     COMPARISON: None     INDICATIONS: LEFT KNEE PAIN AFTER TWISTING INJURY 4 MONTHS AGO.             TECHNIQUE: A complete multi-planar MRI was performed.       FINDINGS:   No fracture or malalignment is identified.  Marrow signal appears within normal limits with the   exception of osteoarthritic marrow edema.     There is a complex tear at the junction of the medial meniscal body and anterior horn exiting the   superior and inferior surfaces.  No tear of the lateral meniscus is seen.     The cruciate ligaments are intact.  The medial collateral ligament, lateral collateral ligament   complex, patellar retinacula and extensor mechanism appear intact.     A small joint effusion is noted.  Grade 4 chondromalacia is noted along the patellar vertical ridge   and medial facet measuring up to 1.9 cm transverse.  Grade 3-4 chondromalacia is noted along the   weight-bearing aspect of the medial femoral condyle measuring up to 2.1 cm transverse.  Grade 1   chondromalacia is noted throughout the lateral compartment.  Osteoarthritic spurring is noted.  No   loose body is evident.     A 4.9 cm x 1.3 cm popliteal cyst is noted.     Impression:    1. Complex tear of the medial meniscus, as above  2. Small joint effusion  3. Osteoarthritic changes, as above  4. 4.9 cm x 1.3 cm popliteal cyst            Jose Jones M.D.         Electronically Signed and Approved By: Jose Jones M.D. on 10/12/2023 at 16:03                   XR Chest PA & Lateral  PROCEDURE: XR CHEST PA AND LATERAL     COMPARISON: [facility], CR, ABDOMEN  SERIES ACUTE, 2/11/2012, 10:33.  [facility], CR, CHEST PA/AP & LAT 2V, 4/03/2018, 10:21.     INDICATIONS: RESPIRATORY DISORDER, ALLERGIC RHINITIS     FINDINGS:      The lungs are well-expanded. The heart and pulmonary vasculature are within normal limits. No   pleural effusions are identified. There are no active appearing infiltrates.     IMPRESSION: No active disease.     NANDO PIMENTEL MD         Electronically Signed and Approved By: NANDO PIMENTEL MD on 10/12/2023 at 13:25                           Assessment and Plan   Diagnoses and all orders for this visit:    1. Osteoarthritis of left knee, unspecified osteoarthritis type (Primary)    2. Left knee pain, unspecified chronicity    Other orders  -     Large Joint Arthrocentesis: TAWANA Rosas is here today to follow up on her left knee osteoarthritis that we have been treating conservatively.  Patient is instructed to continue with her home exercises.  Continue with Tylenol as needed. We discussed the risks and benefits with the patient regarding left knee Synvisc injection. Patient understood and elected to proceed. Patient tolerated injection well with no complications.     Follow up in 6 weeks for reevaluation.     Tobacco Use: Low Risk  (11/1/2023)    Patient History     Smoking Tobacco Use: Never     Smokeless Tobacco Use: Never     Passive Exposure: Not on file       Follow Up   Return in about 6 weeks (around 12/13/2023).  There are no Patient Instructions on file for this visit.  Patient was given instructions and counseling regarding her condition or for health maintenance advice. Please see specific information pulled into the AVS if appropriate.

## 2023-12-04 ENCOUNTER — OFFICE VISIT (OUTPATIENT)
Dept: ORTHOPEDIC SURGERY | Facility: CLINIC | Age: 73
End: 2023-12-04
Payer: MEDICARE

## 2023-12-04 VITALS
DIASTOLIC BLOOD PRESSURE: 81 MMHG | HEART RATE: 92 BPM | SYSTOLIC BLOOD PRESSURE: 142 MMHG | OXYGEN SATURATION: 95 % | BODY MASS INDEX: 32.14 KG/M2 | WEIGHT: 200 LBS | HEIGHT: 66 IN

## 2023-12-04 DIAGNOSIS — M17.11 PRIMARY OSTEOARTHRITIS OF RIGHT KNEE: ICD-10-CM

## 2023-12-04 DIAGNOSIS — M25.561 RIGHT KNEE PAIN, UNSPECIFIED CHRONICITY: Primary | ICD-10-CM

## 2023-12-04 PROCEDURE — 99213 OFFICE O/P EST LOW 20 MIN: CPT | Performed by: STUDENT IN AN ORGANIZED HEALTH CARE EDUCATION/TRAINING PROGRAM

## 2023-12-04 PROCEDURE — 20610 DRAIN/INJ JOINT/BURSA W/O US: CPT | Performed by: STUDENT IN AN ORGANIZED HEALTH CARE EDUCATION/TRAINING PROGRAM

## 2023-12-04 RX ORDER — ALBUTEROL SULFATE 90 UG/1
AEROSOL, METERED RESPIRATORY (INHALATION)
COMMUNITY
Start: 2023-10-11

## 2023-12-04 RX ORDER — EPINEPHRINE 0.3 MG/.3ML
INJECTION SUBCUTANEOUS
COMMUNITY
Start: 2023-10-11

## 2023-12-04 RX ORDER — CETIRIZINE HYDROCHLORIDE 10 MG/1
1 TABLET ORAL DAILY
COMMUNITY
Start: 2023-10-11

## 2023-12-04 RX ORDER — METHYLPREDNISOLONE 4 MG/1
1 TABLET ORAL DAILY
Qty: 21 TABLET | Refills: 0 | Status: SHIPPED | OUTPATIENT
Start: 2023-12-04

## 2023-12-04 NOTE — PROGRESS NOTES
"Chief Complaint  Pain and Initial Evaluation of the Right Knee    Subjective          Alison Mackenzie presents to [facility] for   History of Present Illness    The patient presents here today for evaluation of the right knee. She reports right knee pain with no injury. She reports had been going to the gym exercising the left knee and reports she woke up one morning in the right knee. She locates the pain to the anterior knee. She reports pain with walking.     Allergies   Allergen Reactions    Bactrim Ds [Sulfamethoxazole-Trimethoprim] Itching and Rash        Social History     Socioeconomic History    Marital status:    Tobacco Use    Smoking status: Never    Smokeless tobacco: Never   Vaping Use    Vaping Use: Never used   Substance and Sexual Activity    Alcohol use: Never    Drug use: Never    Sexual activity: Not Currently     Partners: Male     Birth control/protection: Post-menopausal, Hysterectomy     Comment: Hysterectomy        I reviewed the patient's chief complaint, history of present illness, review of systems, past medical history, surgical history, family history, social history, medications, and allergy list.     REVIEW OF SYSTEMS    Constitutional: Denies fevers, chills, weight loss  Cardiovascular: Denies chest pain, shortness of breath  Skin: Denies rashes, acute skin changes  Neurologic: Denies headache, loss of consciousness  MSK: Right knee pain      Objective   Vital Signs:   /81   Pulse 92   Ht 167.6 cm (66\")   Wt 90.7 kg (200 lb)   SpO2 95%   BMI 32.28 kg/m²     Body mass index is 32.28 kg/m².    Physical Exam    General: Alert. No acute distress.   Right knee- healed transverse incision to front of knee. ROM 0-120 degrees. Positive crepitus. Stable to varus/valgus stress. Stable to anterior/posterior drawer. Tender to the pes bursa. Medial joint line tenderness. No effusion. Negative Chung's. Negative Lachman's. Positive EHL, FHL, " GS and TA. Sensation intact to all 5 nerves of the foot. Positive pulses.     Large Joint Arthrocentesis: R knee  Date/Time: 12/4/2023 10:53 AM  Consent given by: patient  Site marked: site marked  Timeout: Immediately prior to procedure a time out was called to verify the correct patient, procedure, equipment, support staff and site/side marked as required   Supporting Documentation  Indications: pain   Procedure Details  Location: knee - R knee  Needle gauge: 21 G.  Medications administered: 48 mg hylan 48 MG/6ML  Patient tolerance: patient tolerated the procedure well with no immediate complications        Imaging Results (Most Recent)       Procedure Component Value Units Date/Time    XR Knee 4+ View Right [[id number]] Resulted: 12/04/23 1251     Updated: 12/04/23 1253    Narrative:      Indications: Right knee pain    Views: Weightbearing AP, PA flexion, lateral, sunrise right knee    Findings: Moderate degenerative changes seen in the medial and   patellofemoral compartments.  No fractures.    Comparative Data: No comparative data available                     Assessment and Plan        XR Knee 4+ View Right    Result Date: 12/4/2023  Narrative: Indications: Right knee pain Views: Weightbearing AP, PA flexion, lateral, sunrise right knee Findings: Moderate degenerative changes seen in the medial and patellofemoral compartments.  No fractures. Comparative Data: No comparative data available      Diagnoses and all orders for this visit:    1. Right knee pain, unspecified chronicity (Primary)  -     XR Knee 4+ View Right  -     methylPREDNISolone (MEDROL) 4 MG dose pack; Take 1 tablet by mouth Daily. Use as directed by package instructions  Dispense: 21 tablet; Refill: 0  -     Ibuprofen 3 %, Gabapentin 10 %, Baclofen 2 %, lidocaine 4 %; Apply 1-2 g topically to the appropriate area as directed 3 (Three) to 4 (Four) times daily.  Dispense: 90 g; Refill: 1    2. Primary osteoarthritis of right knee  -      "methylPREDNISolone (MEDROL) 4 MG dose pack; Take 1 tablet by mouth Daily. Use as directed by package instructions  Dispense: 21 tablet; Refill: 0  -     Ibuprofen 3 %, Gabapentin 10 %, Baclofen 2 %, lidocaine 4 %; Apply 1-2 g topically to the appropriate area as directed 3 (Three) to 4 (Four) times daily.  Dispense: 90 g; Refill: 1    Other orders  -     Large Joint Arthrocentesis: R knee        Discussed the treatment plan with the patient.  I reviewed the x-rays that were obtained today with the patient. Plan for conservative treatment at this time. She had a reaction to a previous steroid injection \"steroid flare\". Discussed the risks and benefits of a right knee Synvisc injection. The patient expressed understanding and wished to proceed. She tolerated the injection well.       Call or return if worsening symptoms.    Scribed for Lionel Theodore MD by Rain Wang  12/04/2023   10:33 EST         Follow Up       6 weeks    Patient was given instructions and counseling regarding her condition or for health maintenance advice. Please see specific information pulled into the AVS if appropriate.       I have personally performed the services described in this document as scribed by the above individual and it is both accurate and complete.     Lionel Theodore MD  12/04/23  14:33 EST        "

## 2024-01-02 RX ORDER — TRIAMTERENE AND HYDROCHLOROTHIAZIDE 37.5; 25 MG/1; MG/1
CAPSULE ORAL
Qty: 90 CAPSULE | Refills: 3 | Status: SHIPPED | OUTPATIENT
Start: 2024-01-02

## 2024-01-02 RX ORDER — CITALOPRAM 20 MG/1
TABLET ORAL
Qty: 90 TABLET | Refills: 3 | Status: SHIPPED | OUTPATIENT
Start: 2024-01-02

## 2024-01-29 ENCOUNTER — OFFICE VISIT (OUTPATIENT)
Dept: ORTHOPEDIC SURGERY | Facility: CLINIC | Age: 74
End: 2024-01-29
Payer: MEDICARE

## 2024-01-29 VITALS — HEART RATE: 91 BPM | OXYGEN SATURATION: 93 % | SYSTOLIC BLOOD PRESSURE: 120 MMHG | DIASTOLIC BLOOD PRESSURE: 79 MMHG

## 2024-01-29 DIAGNOSIS — M25.562 LEFT KNEE PAIN, UNSPECIFIED CHRONICITY: ICD-10-CM

## 2024-01-29 DIAGNOSIS — M17.12 OSTEOARTHRITIS OF LEFT KNEE, UNSPECIFIED OSTEOARTHRITIS TYPE: Primary | ICD-10-CM

## 2024-01-29 PROCEDURE — 99213 OFFICE O/P EST LOW 20 MIN: CPT

## 2024-01-29 NOTE — PROGRESS NOTES
"Chief Complaint  Follow-up of the Right Knee and Follow-up of the Left Knee    Subjective      Alison Mackenzie presents to [facility] for follow up of her bilateral knees.  Patient has a previous diagnosis of right knee osteoarthritis that we have been treating conservatively.  During her last office visit with Dr. Theodore on 12/4/2023 she received a right knee Synvisc injection, Medrol Dosepak, and compound pain cream prescription.  She last received a left knee Synvisc injection in office on 11/1/2023.    Today she states, her right knee is doing well.  Her left knee is starting to bother her but she has been using the compound cream.  She reports that she does not want to try the steroid joint injection again, as last time she had a \"not so great reaction\".  She denies any falls or injuries.  She does report that it has slowly started to get worse on her.     Allergies   Allergen Reactions    Bactrim Ds [Sulfamethoxazole-Trimethoprim] Itching and Rash       Objective     Vital Signs:   Vitals:    01/29/24 1306   BP: 120/79   Pulse: 91   SpO2: 93%   PainSc:   1     There is no height or weight on file to calculate BMI.    I reviewed the patient's chief complaint, history of present illness, review of systems, past medical history, surgical history, family history, social history, medications, and allergy list.     REVIEW OF SYSTEMS    Constitutional: Denies fevers, chills, weight loss  Cardiovascular: Denies chest pain, shortness of breath  Skin: Denies rashes, acute skin changes  Neurologic: Denies headache, loss of consciousness  MSK: Right knee pain.     Ortho Exam  Left lower extremity: Mild knee effusion.  Tenderness to palpation along the medial joint line.  Full knee extension.  Knee flexion 120 degrees.  Calf soft, nontender.  Demonstrates active ankle dorsiflexion and plantarflexion.  Palpable pedal pulse.  Sensation intact.          Imaging Results (Most Recent)       " None                Assessment and Plan   Diagnoses and all orders for this visit:    1. Osteoarthritis of left knee, unspecified osteoarthritis type (Primary)    2. Left knee pain, unspecified chronicity         Alison Mackenzie presents today following up on her right knee.  Patient has right knee osteoarthritis that we have been treating conservatively with intermittent injections.  She last received a right knee Synvisc injection on 12/4/2023.  In addition she received a steroid Dosepak and topical compound pain cream.  Patient does not want to try a repeat knee steroid, and is eligible for repeat left knee Synvisc injection on or after May 1.  Patient elected to continue conservative treatment at this time.  Discussed physical therapy however patient deferred at this time.    Will follow-up on this after after May 1 when she is eligible for repeat left knee Synvisc injection.      Tobacco Use: Low Risk  (1/29/2024)    Patient History     Smoking Tobacco Use: Never     Smokeless Tobacco Use: Never     Passive Exposure: Not on file     Patient reports that they are a nonsmoker; cessation education not applicable.            Follow Up   Return for Eligible for repeat Synvisc left knee injection on or after May 1..  There are no Patient Instructions on file for this visit.  Patient was given instructions and counseling regarding her condition or for health maintenance advice. Please see specific information pulled into the AVS if appropriate.

## 2024-02-01 ENCOUNTER — LAB (OUTPATIENT)
Dept: LAB | Facility: HOSPITAL | Age: 74
End: 2024-02-01
Payer: MEDICARE

## 2024-02-01 DIAGNOSIS — G62.9 PERIPHERAL POLYNEUROPATHY: ICD-10-CM

## 2024-02-01 DIAGNOSIS — M25.562 ACUTE PAIN OF LEFT KNEE: ICD-10-CM

## 2024-02-01 DIAGNOSIS — R74.8 ELEVATED LIVER ENZYMES: ICD-10-CM

## 2024-02-01 DIAGNOSIS — R73.01 IFG (IMPAIRED FASTING GLUCOSE): ICD-10-CM

## 2024-02-01 DIAGNOSIS — F41.1 ANXIETY, GENERALIZED: ICD-10-CM

## 2024-02-01 DIAGNOSIS — G47.33 OSA ON CPAP: ICD-10-CM

## 2024-02-01 DIAGNOSIS — M81.0 OSTEOPOROSIS, UNSPECIFIED OSTEOPOROSIS TYPE, UNSPECIFIED PATHOLOGICAL FRACTURE PRESENCE: ICD-10-CM

## 2024-02-01 DIAGNOSIS — E78.2 MIXED HYPERLIPIDEMIA: ICD-10-CM

## 2024-02-01 DIAGNOSIS — E66.09 CLASS 1 OBESITY DUE TO EXCESS CALORIES WITH SERIOUS COMORBIDITY AND BODY MASS INDEX (BMI) OF 31.0 TO 31.9 IN ADULT: ICD-10-CM

## 2024-02-01 DIAGNOSIS — H81.01 MENIERE DISEASE, RIGHT: ICD-10-CM

## 2024-02-01 DIAGNOSIS — E55.9 VITAMIN D DEFICIENCY: ICD-10-CM

## 2024-02-01 LAB
ALBUMIN SERPL-MCNC: 4.1 G/DL (ref 3.5–5.2)
ALBUMIN UR-MCNC: 1.3 MG/DL
ALBUMIN/GLOB SERPL: 1.6 G/DL
ALP SERPL-CCNC: 90 U/L (ref 39–117)
ALT SERPL W P-5'-P-CCNC: 83 U/L (ref 1–33)
ANION GAP SERPL CALCULATED.3IONS-SCNC: 13.6 MMOL/L (ref 5–15)
AST SERPL-CCNC: 64 U/L (ref 1–32)
BASOPHILS # BLD AUTO: 0.06 10*3/MM3 (ref 0–0.2)
BASOPHILS NFR BLD AUTO: 0.6 % (ref 0–1.5)
BILIRUB SERPL-MCNC: 0.4 MG/DL (ref 0–1.2)
BUN SERPL-MCNC: 15 MG/DL (ref 8–23)
BUN/CREAT SERPL: 16.3 (ref 7–25)
CALCIUM SPEC-SCNC: 9.8 MG/DL (ref 8.6–10.5)
CHLORIDE SERPL-SCNC: 101 MMOL/L (ref 98–107)
CHOLEST SERPL-MCNC: 216 MG/DL (ref 0–200)
CO2 SERPL-SCNC: 25.4 MMOL/L (ref 22–29)
CREAT SERPL-MCNC: 0.92 MG/DL (ref 0.57–1)
DEPRECATED RDW RBC AUTO: 40.9 FL (ref 37–54)
EGFRCR SERPLBLD CKD-EPI 2021: 66.3 ML/MIN/1.73
EOSINOPHIL # BLD AUTO: 0.33 10*3/MM3 (ref 0–0.4)
EOSINOPHIL NFR BLD AUTO: 3.3 % (ref 0.3–6.2)
ERYTHROCYTE [DISTWIDTH] IN BLOOD BY AUTOMATED COUNT: 12.1 % (ref 12.3–15.4)
GLOBULIN UR ELPH-MCNC: 2.5 GM/DL
GLUCOSE SERPL-MCNC: 130 MG/DL (ref 65–99)
HBA1C MFR BLD: 7 % (ref 4.8–5.6)
HCT VFR BLD AUTO: 44.8 % (ref 34–46.6)
HDLC SERPL-MCNC: 45 MG/DL (ref 40–60)
HGB BLD-MCNC: 15 G/DL (ref 12–15.9)
IMM GRANULOCYTES # BLD AUTO: 0.04 10*3/MM3 (ref 0–0.05)
IMM GRANULOCYTES NFR BLD AUTO: 0.4 % (ref 0–0.5)
LDLC SERPL CALC-MCNC: 145 MG/DL (ref 0–100)
LDLC/HDLC SERPL: 3.16 {RATIO}
LYMPHOCYTES # BLD AUTO: 2.66 10*3/MM3 (ref 0.7–3.1)
LYMPHOCYTES NFR BLD AUTO: 26.4 % (ref 19.6–45.3)
MCH RBC QN AUTO: 31 PG (ref 26.6–33)
MCHC RBC AUTO-ENTMCNC: 33.5 G/DL (ref 31.5–35.7)
MCV RBC AUTO: 92.6 FL (ref 79–97)
MONOCYTES # BLD AUTO: 0.92 10*3/MM3 (ref 0.1–0.9)
MONOCYTES NFR BLD AUTO: 9.1 % (ref 5–12)
NEUTROPHILS NFR BLD AUTO: 6.05 10*3/MM3 (ref 1.7–7)
NEUTROPHILS NFR BLD AUTO: 60.2 % (ref 42.7–76)
NRBC BLD AUTO-RTO: 0 /100 WBC (ref 0–0.2)
PLATELET # BLD AUTO: 284 10*3/MM3 (ref 140–450)
PMV BLD AUTO: 11.5 FL (ref 6–12)
POTASSIUM SERPL-SCNC: 4.3 MMOL/L (ref 3.5–5.2)
PROT SERPL-MCNC: 6.6 G/DL (ref 6–8.5)
RBC # BLD AUTO: 4.84 10*6/MM3 (ref 3.77–5.28)
SODIUM SERPL-SCNC: 140 MMOL/L (ref 136–145)
TRIGL SERPL-MCNC: 143 MG/DL (ref 0–150)
VLDLC SERPL-MCNC: 26 MG/DL (ref 5–40)
WBC NRBC COR # BLD AUTO: 10.06 10*3/MM3 (ref 3.4–10.8)

## 2024-02-01 PROCEDURE — 80053 COMPREHEN METABOLIC PANEL: CPT

## 2024-02-01 PROCEDURE — 36415 COLL VENOUS BLD VENIPUNCTURE: CPT

## 2024-02-01 PROCEDURE — 85025 COMPLETE CBC W/AUTO DIFF WBC: CPT

## 2024-02-01 PROCEDURE — 82043 UR ALBUMIN QUANTITATIVE: CPT

## 2024-02-01 PROCEDURE — 83036 HEMOGLOBIN GLYCOSYLATED A1C: CPT

## 2024-02-01 PROCEDURE — 80061 LIPID PANEL: CPT

## 2024-02-12 ENCOUNTER — OFFICE VISIT (OUTPATIENT)
Dept: INTERNAL MEDICINE | Facility: CLINIC | Age: 74
End: 2024-02-12
Payer: MEDICARE

## 2024-02-12 VITALS
WEIGHT: 209 LBS | HEIGHT: 66 IN | BODY MASS INDEX: 33.59 KG/M2 | SYSTOLIC BLOOD PRESSURE: 162 MMHG | DIASTOLIC BLOOD PRESSURE: 82 MMHG | OXYGEN SATURATION: 92 % | TEMPERATURE: 98.2 F | HEART RATE: 96 BPM

## 2024-02-12 DIAGNOSIS — R73.01 IFG (IMPAIRED FASTING GLUCOSE): ICD-10-CM

## 2024-02-12 DIAGNOSIS — Z00.00 MEDICARE ANNUAL WELLNESS VISIT, SUBSEQUENT: Primary | ICD-10-CM

## 2024-02-12 DIAGNOSIS — H81.01 MENIERE DISEASE, RIGHT: ICD-10-CM

## 2024-02-12 DIAGNOSIS — G47.33 OSA ON CPAP: ICD-10-CM

## 2024-02-12 DIAGNOSIS — G62.9 PERIPHERAL POLYNEUROPATHY: ICD-10-CM

## 2024-02-12 DIAGNOSIS — H93.13 TINNITUS OF BOTH EARS: ICD-10-CM

## 2024-02-12 DIAGNOSIS — M25.562 ACUTE PAIN OF LEFT KNEE: ICD-10-CM

## 2024-02-12 DIAGNOSIS — E55.9 VITAMIN D DEFICIENCY: ICD-10-CM

## 2024-02-12 DIAGNOSIS — E78.2 MIXED HYPERLIPIDEMIA: ICD-10-CM

## 2024-02-12 DIAGNOSIS — F41.1 ANXIETY, GENERALIZED: ICD-10-CM

## 2024-02-12 DIAGNOSIS — R74.8 ELEVATED LIVER ENZYMES: ICD-10-CM

## 2024-02-12 RX ORDER — DULAGLUTIDE 1.5 MG/.5ML
1.5 INJECTION, SOLUTION SUBCUTANEOUS WEEKLY
Qty: 2 ML | Refills: 5 | Status: SHIPPED | OUTPATIENT
Start: 2024-02-12

## 2024-02-12 RX ORDER — DULOXETIN HYDROCHLORIDE 30 MG/1
30 CAPSULE, DELAYED RELEASE ORAL DAILY
Qty: 30 CAPSULE | Refills: 5 | Status: SHIPPED | OUTPATIENT
Start: 2024-02-12

## 2024-02-12 RX ORDER — ROSUVASTATIN CALCIUM 10 MG/1
10 TABLET, COATED ORAL DAILY
Qty: 90 TABLET | Refills: 3 | Status: SHIPPED | OUTPATIENT
Start: 2024-02-12

## 2024-04-11 ENCOUNTER — TELEPHONE (OUTPATIENT)
Dept: INTERNAL MEDICINE | Age: 74
End: 2024-04-11
Payer: MEDICARE

## 2024-04-11 NOTE — TELEPHONE ENCOUNTER
Caller: Alison Mackenzie    Relationship to patient: Self    Best call back number: [phone]    Patient is needing: PATIENT CALLED WANTING TO LET MD TROY KNOW SHE HAS BEEN UNABLE TO FIND HER MOUNJARO AT ANY PHARMACIES. PATIENT STATES SHE HAS CHECK IN ALL SURROUNDING COUNTIES AND NO ONE HAS HER DOSAGE. PATIENT WANTING TO KNOW WHAT MD TROY WOULD LIKE HER TO DO NEXT.

## 2024-04-12 RX ORDER — SEMAGLUTIDE 1.34 MG/ML
0.25 INJECTION, SOLUTION SUBCUTANEOUS WEEKLY
Qty: 1.5 ML | Refills: 5 | Status: SHIPPED | OUTPATIENT
Start: 2024-04-12

## 2024-04-17 ENCOUNTER — TELEPHONE (OUTPATIENT)
Dept: INTERNAL MEDICINE | Age: 74
End: 2024-04-17
Payer: MEDICARE

## 2024-04-17 NOTE — TELEPHONE ENCOUNTER
Caller: Alison Mackenzie    Relationship to patient: Self    Best call back number:     [phone]       Patient is needing: PATIENT STATES THAT MEDICARE HAS DENIED COVERAGE FOR OZEMPIC AND SHE WOULD LIKE TO KNOW WHAT TO DO NOW.

## 2024-05-06 ENCOUNTER — OFFICE VISIT (OUTPATIENT)
Dept: INTERNAL MEDICINE | Age: 74
End: 2024-05-06
Payer: MEDICARE

## 2024-05-06 VITALS
SYSTOLIC BLOOD PRESSURE: 129 MMHG | DIASTOLIC BLOOD PRESSURE: 78 MMHG | BODY MASS INDEX: 33.27 KG/M2 | OXYGEN SATURATION: 96 % | TEMPERATURE: 98.2 F | WEIGHT: 207 LBS | HEIGHT: 66 IN | HEART RATE: 78 BPM

## 2024-05-06 DIAGNOSIS — H93.13 TINNITUS OF BOTH EARS: ICD-10-CM

## 2024-05-06 DIAGNOSIS — E78.2 MIXED HYPERLIPIDEMIA: ICD-10-CM

## 2024-05-06 DIAGNOSIS — R73.01 IFG (IMPAIRED FASTING GLUCOSE): ICD-10-CM

## 2024-05-06 DIAGNOSIS — Z12.11 COLON CANCER SCREENING: ICD-10-CM

## 2024-05-06 DIAGNOSIS — H81.01 MENIERE DISEASE, RIGHT: ICD-10-CM

## 2024-05-06 DIAGNOSIS — G62.9 PERIPHERAL POLYNEUROPATHY: ICD-10-CM

## 2024-05-06 DIAGNOSIS — G47.33 OSA ON CPAP: ICD-10-CM

## 2024-05-06 DIAGNOSIS — F41.1 ANXIETY, GENERALIZED: ICD-10-CM

## 2024-05-06 DIAGNOSIS — M81.0 OSTEOPOROSIS, UNSPECIFIED OSTEOPOROSIS TYPE, UNSPECIFIED PATHOLOGICAL FRACTURE PRESENCE: ICD-10-CM

## 2024-05-06 DIAGNOSIS — G47.33 OBSTRUCTIVE SLEEP APNEA SYNDROME: ICD-10-CM

## 2024-05-06 DIAGNOSIS — R74.8 ELEVATED LIVER ENZYMES: ICD-10-CM

## 2024-05-06 DIAGNOSIS — E66.09 CLASS 1 OBESITY DUE TO EXCESS CALORIES WITH SERIOUS COMORBIDITY AND BODY MASS INDEX (BMI) OF 31.0 TO 31.9 IN ADULT: Primary | ICD-10-CM

## 2024-05-06 DIAGNOSIS — E55.9 VITAMIN D DEFICIENCY: ICD-10-CM

## 2024-05-06 PROCEDURE — 1170F FXNL STATUS ASSESSED: CPT | Performed by: INTERNAL MEDICINE

## 2024-05-06 PROCEDURE — 99214 OFFICE O/P EST MOD 30 MIN: CPT | Performed by: INTERNAL MEDICINE

## 2024-05-06 PROCEDURE — G2211 COMPLEX E/M VISIT ADD ON: HCPCS | Performed by: INTERNAL MEDICINE

## 2024-05-06 RX ORDER — DULOXETIN HYDROCHLORIDE 30 MG/1
30 CAPSULE, DELAYED RELEASE ORAL DAILY
Qty: 90 CAPSULE | Refills: 3 | Status: SHIPPED | OUTPATIENT
Start: 2024-05-06

## 2024-05-06 RX ORDER — DULOXETIN HYDROCHLORIDE 60 MG/1
60 CAPSULE, DELAYED RELEASE ORAL DAILY
Qty: 90 CAPSULE | Refills: 3 | Status: SHIPPED | OUTPATIENT
Start: 2024-05-06 | End: 2024-05-06

## 2024-08-15 ENCOUNTER — TELEPHONE (OUTPATIENT)
Dept: INTERNAL MEDICINE | Age: 74
End: 2024-08-15
Payer: MEDICARE

## 2024-08-15 DIAGNOSIS — E66.09 CLASS 1 OBESITY DUE TO EXCESS CALORIES WITH SERIOUS COMORBIDITY AND BODY MASS INDEX (BMI) OF 31.0 TO 31.9 IN ADULT: Primary | ICD-10-CM

## 2024-08-15 NOTE — TELEPHONE ENCOUNTER
Caller: [id number] - [place], [place] - [address] [facility] [phone] [facility] [phone] FX    Relationship: Pharmacy    Best call back number: [phone]     Requested Prescriptions:     MOUNJARO 7.5 MG/0.5 ML    Pharmacy where request should be sent: [id number] - [place], [place] - [address] [facility] [phone] [facility] [phone] FX     Last office visit with prescribing clinician: 5/6/2024   Last telemedicine visit with prescribing clinician: Visit date not found   Next office visit with prescribing clinician: 9/10/2024     Additional details provided by patient:     PHARMACY STATING PATIENT REQUESTING NEXT DOSAGE UP     Does the patient have less than a 3 day supply:  [] Yes  [x] No    Would you like a call back once the refill request has been completed: [] Yes [] No    If the office needs to give you a call back, can they leave a voicemail: [] Yes [] No    Ferdi Chaudhry Rep   08/15/24 10:08 EDT

## 2024-09-03 ENCOUNTER — TRANSCRIBE ORDERS (OUTPATIENT)
Dept: ADMINISTRATIVE | Facility: HOSPITAL | Age: 74
End: 2024-09-03
Payer: MEDICARE

## 2024-09-03 ENCOUNTER — LAB (OUTPATIENT)
Dept: LAB | Facility: HOSPITAL | Age: 74
End: 2024-09-03
Payer: MEDICARE

## 2024-09-03 DIAGNOSIS — Z12.11 COLON CANCER SCREENING: ICD-10-CM

## 2024-09-03 DIAGNOSIS — R74.8 ELEVATED LIVER ENZYMES: ICD-10-CM

## 2024-09-03 DIAGNOSIS — H81.01 MENIERE DISEASE, RIGHT: ICD-10-CM

## 2024-09-03 DIAGNOSIS — M81.0 OSTEOPOROSIS, UNSPECIFIED OSTEOPOROSIS TYPE, UNSPECIFIED PATHOLOGICAL FRACTURE PRESENCE: ICD-10-CM

## 2024-09-03 DIAGNOSIS — E78.2 MIXED HYPERLIPIDEMIA: ICD-10-CM

## 2024-09-03 DIAGNOSIS — G47.33 OSA ON CPAP: ICD-10-CM

## 2024-09-03 DIAGNOSIS — Z12.31 SCREENING MAMMOGRAM FOR HIGH-RISK PATIENT: Primary | ICD-10-CM

## 2024-09-03 DIAGNOSIS — E66.09 CLASS 1 OBESITY DUE TO EXCESS CALORIES WITH SERIOUS COMORBIDITY AND BODY MASS INDEX (BMI) OF 31.0 TO 31.9 IN ADULT: ICD-10-CM

## 2024-09-03 DIAGNOSIS — G62.9 PERIPHERAL POLYNEUROPATHY: ICD-10-CM

## 2024-09-03 DIAGNOSIS — Z00.00 MEDICARE ANNUAL WELLNESS VISIT, SUBSEQUENT: ICD-10-CM

## 2024-09-03 DIAGNOSIS — R73.01 IFG (IMPAIRED FASTING GLUCOSE): ICD-10-CM

## 2024-09-03 DIAGNOSIS — E55.9 VITAMIN D DEFICIENCY: ICD-10-CM

## 2024-09-03 DIAGNOSIS — H93.13 TINNITUS OF BOTH EARS: ICD-10-CM

## 2024-09-03 DIAGNOSIS — M25.562 ACUTE PAIN OF LEFT KNEE: ICD-10-CM

## 2024-09-03 DIAGNOSIS — F41.1 ANXIETY, GENERALIZED: ICD-10-CM

## 2024-09-03 DIAGNOSIS — G47.33 OBSTRUCTIVE SLEEP APNEA SYNDROME: ICD-10-CM

## 2024-09-03 LAB
ALBUMIN SERPL-MCNC: 4.2 G/DL (ref 3.5–5.2)
ALBUMIN/GLOB SERPL: 1.4 G/DL
ALP SERPL-CCNC: 93 U/L (ref 39–117)
ALT SERPL W P-5'-P-CCNC: 25 U/L (ref 1–33)
ANION GAP SERPL CALCULATED.3IONS-SCNC: 12.9 MMOL/L (ref 5–15)
AST SERPL-CCNC: 31 U/L (ref 1–32)
BASOPHILS # BLD AUTO: 0.06 10*3/MM3 (ref 0–0.2)
BASOPHILS NFR BLD AUTO: 0.7 % (ref 0–1.5)
BILIRUB SERPL-MCNC: 0.6 MG/DL (ref 0–1.2)
BUN SERPL-MCNC: 15 MG/DL (ref 8–23)
BUN/CREAT SERPL: 15.6 (ref 7–25)
CALCIUM SPEC-SCNC: 9.8 MG/DL (ref 8.6–10.5)
CHLORIDE SERPL-SCNC: 101 MMOL/L (ref 98–107)
CHOLEST SERPL-MCNC: 203 MG/DL (ref 0–200)
CO2 SERPL-SCNC: 26.1 MMOL/L (ref 22–29)
CREAT SERPL-MCNC: 0.96 MG/DL (ref 0.57–1)
DEPRECATED RDW RBC AUTO: 40.4 FL (ref 37–54)
EGFRCR SERPLBLD CKD-EPI 2021: 63 ML/MIN/1.73
EOSINOPHIL # BLD AUTO: 0.19 10*3/MM3 (ref 0–0.4)
EOSINOPHIL NFR BLD AUTO: 2.1 % (ref 0.3–6.2)
ERYTHROCYTE [DISTWIDTH] IN BLOOD BY AUTOMATED COUNT: 12.2 % (ref 12.3–15.4)
GLOBULIN UR ELPH-MCNC: 3 GM/DL
GLUCOSE SERPL-MCNC: 85 MG/DL (ref 65–99)
HBA1C MFR BLD: 5.4 % (ref 4.8–5.6)
HCT VFR BLD AUTO: 45.4 % (ref 34–46.6)
HDLC SERPL-MCNC: 37 MG/DL (ref 40–60)
HGB BLD-MCNC: 15.4 G/DL (ref 12–15.9)
IMM GRANULOCYTES # BLD AUTO: 0.02 10*3/MM3 (ref 0–0.05)
IMM GRANULOCYTES NFR BLD AUTO: 0.2 % (ref 0–0.5)
LDLC SERPL CALC-MCNC: 138 MG/DL (ref 0–100)
LDLC/HDLC SERPL: 3.64 {RATIO}
LYMPHOCYTES # BLD AUTO: 2.03 10*3/MM3 (ref 0.7–3.1)
LYMPHOCYTES NFR BLD AUTO: 22 % (ref 19.6–45.3)
MCH RBC QN AUTO: 31.1 PG (ref 26.6–33)
MCHC RBC AUTO-ENTMCNC: 33.9 G/DL (ref 31.5–35.7)
MCV RBC AUTO: 91.7 FL (ref 79–97)
MONOCYTES # BLD AUTO: 0.72 10*3/MM3 (ref 0.1–0.9)
MONOCYTES NFR BLD AUTO: 7.8 % (ref 5–12)
NEUTROPHILS NFR BLD AUTO: 6.2 10*3/MM3 (ref 1.7–7)
NEUTROPHILS NFR BLD AUTO: 67.2 % (ref 42.7–76)
NRBC BLD AUTO-RTO: 0 /100 WBC (ref 0–0.2)
PLATELET # BLD AUTO: 315 10*3/MM3 (ref 140–450)
PMV BLD AUTO: 11.9 FL (ref 6–12)
POTASSIUM SERPL-SCNC: 4.4 MMOL/L (ref 3.5–5.2)
PROT SERPL-MCNC: 7.2 G/DL (ref 6–8.5)
RBC # BLD AUTO: 4.95 10*6/MM3 (ref 3.77–5.28)
SODIUM SERPL-SCNC: 140 MMOL/L (ref 136–145)
TRIGL SERPL-MCNC: 156 MG/DL (ref 0–150)
VLDLC SERPL-MCNC: 28 MG/DL (ref 5–40)
WBC NRBC COR # BLD AUTO: 9.22 10*3/MM3 (ref 3.4–10.8)

## 2024-09-03 PROCEDURE — 80061 LIPID PANEL: CPT

## 2024-09-03 PROCEDURE — 36415 COLL VENOUS BLD VENIPUNCTURE: CPT

## 2024-09-03 PROCEDURE — 83036 HEMOGLOBIN GLYCOSYLATED A1C: CPT

## 2024-09-03 PROCEDURE — 80053 COMPREHEN METABOLIC PANEL: CPT

## 2024-09-03 PROCEDURE — 85025 COMPLETE CBC W/AUTO DIFF WBC: CPT

## 2024-09-10 ENCOUNTER — OFFICE VISIT (OUTPATIENT)
Dept: INTERNAL MEDICINE | Age: 74
End: 2024-09-10
Payer: MEDICARE

## 2024-09-10 VITALS
BODY MASS INDEX: 27 KG/M2 | TEMPERATURE: 98.2 F | HEART RATE: 86 BPM | OXYGEN SATURATION: 94 % | HEIGHT: 66 IN | WEIGHT: 168 LBS | DIASTOLIC BLOOD PRESSURE: 78 MMHG | SYSTOLIC BLOOD PRESSURE: 125 MMHG

## 2024-09-10 DIAGNOSIS — H81.01 MENIERE DISEASE, RIGHT: ICD-10-CM

## 2024-09-10 DIAGNOSIS — N30.00 ACUTE CYSTITIS WITHOUT HEMATURIA: ICD-10-CM

## 2024-09-10 DIAGNOSIS — E55.9 VITAMIN D DEFICIENCY: ICD-10-CM

## 2024-09-10 DIAGNOSIS — E78.2 MIXED HYPERLIPIDEMIA: ICD-10-CM

## 2024-09-10 DIAGNOSIS — G47.33 OSA ON CPAP: ICD-10-CM

## 2024-09-10 DIAGNOSIS — Z12.11 COLON CANCER SCREENING: ICD-10-CM

## 2024-09-10 DIAGNOSIS — R74.8 ELEVATED LIVER ENZYMES: ICD-10-CM

## 2024-09-10 DIAGNOSIS — G62.9 PERIPHERAL POLYNEUROPATHY: Primary | ICD-10-CM

## 2024-09-10 DIAGNOSIS — R73.01 IFG (IMPAIRED FASTING GLUCOSE): ICD-10-CM

## 2024-09-10 DIAGNOSIS — F41.1 ANXIETY, GENERALIZED: ICD-10-CM

## 2024-09-10 PROCEDURE — 1125F AMNT PAIN NOTED PAIN PRSNT: CPT | Performed by: INTERNAL MEDICINE

## 2024-09-10 PROCEDURE — 3044F HG A1C LEVEL LT 7.0%: CPT | Performed by: INTERNAL MEDICINE

## 2024-09-10 PROCEDURE — G2211 COMPLEX E/M VISIT ADD ON: HCPCS | Performed by: INTERNAL MEDICINE

## 2024-09-10 PROCEDURE — 99214 OFFICE O/P EST MOD 30 MIN: CPT | Performed by: INTERNAL MEDICINE

## 2024-09-10 NOTE — PROGRESS NOTES
"CHIEF COMPLAINT/ HPI:  Hyperlipidemia (Routine follow up, Lab follow up. Pt has some questions regarding Mounjaro. Pt states still feeling nausea.)              Objective   Vital Signs  Vitals:    09/10/24 0943   BP: 125/78   Pulse: 86   Temp: 98.2 °F (36.8 °C)   SpO2: 94%   Weight: 76.2 kg (168 lb)   Height: 167.6 cm (65.98\")      Body mass index is 27.13 kg/m².  Review of Systems   Constitutional: Negative.    HENT: Negative.     Eyes: Negative.    Respiratory: Negative.     Cardiovascular: Negative.    Gastrointestinal: Negative.    Endocrine: Negative.    Genitourinary: Negative.    Musculoskeletal: Negative.    Allergic/Immunologic: Negative.    Neurological: Negative.    Hematological: Negative.    Psychiatric/Behavioral: Negative.        Physical Exam  Constitutional:       General: She is not in acute distress.     Appearance: Normal appearance.   HENT:      Head: Normocephalic.      Mouth/Throat:      Mouth: Mucous membranes are moist.   Eyes:      Conjunctiva/sclera: Conjunctivae normal.      Pupils: Pupils are equal, round, and reactive to light.   Cardiovascular:      Rate and Rhythm: Normal rate and regular rhythm.      Pulses: Normal pulses.      Heart sounds: Normal heart sounds.   Pulmonary:      Effort: Pulmonary effort is normal.      Breath sounds: Normal breath sounds.   Abdominal:      General: Abdomen is flat. Bowel sounds are normal.      Palpations: Abdomen is soft.   Musculoskeletal:         General: No swelling. Normal range of motion.      Cervical back: Neck supple.   Skin:     General: Skin is warm and dry.      Coloration: Skin is not jaundiced.   Neurological:      General: No focal deficit present.      Mental Status: She is alert and oriented to person, place, and time. Mental status is at baseline.   Psychiatric:         Mood and Affect: Mood normal.         Behavior: Behavior normal.         Thought Content: Thought content normal.         Judgment: Judgment normal.        Result " "Review :   No results found for: \"PROBNP\", \"BNP\"  CMP          2/1/2024    08:42 9/3/2024    08:45   CMP   Glucose 130  85    BUN 15  15    Creatinine 0.92  0.96    EGFR 66.3  63.0    Sodium 140  140    Potassium 4.3  4.4    Chloride 101  101    Calcium 9.8  9.8    Total Protein 6.6  7.2    Albumin 4.1  4.2    Globulin 2.5  3.0    Total Bilirubin 0.4  0.6    Alkaline Phosphatase 90  93    AST (SGOT) 64  31    ALT (SGPT) 83  25    Albumin/Globulin Ratio 1.6  1.4    BUN/Creatinine Ratio 16.3  15.6    Anion Gap 13.6  12.9      CBC w/diff          2/1/2024    08:42 9/3/2024    08:45   CBC w/Diff   WBC 10.06  9.22    RBC 4.84  4.95    Hemoglobin 15.0  15.4    Hematocrit 44.8  45.4    MCV 92.6  91.7    MCH 31.0  31.1    MCHC 33.5  33.9    RDW 12.1  12.2    Platelets 284  315    Neutrophil Rel % 60.2  67.2    Immature Granulocyte Rel % 0.4  0.2    Lymphocyte Rel % 26.4  22.0    Monocyte Rel % 9.1  7.8    Eosinophil Rel % 3.3  2.1    Basophil Rel % 0.6  0.7       Lipid Panel          2/1/2024    08:42 9/3/2024    08:45   Lipid Panel   Total Cholesterol 216  203    Triglycerides 143  156    HDL Cholesterol 45  37    VLDL Cholesterol 26  28    LDL Cholesterol  145  138    LDL/HDL Ratio 3.16  3.64       Lab Results   Component Value Date    TSH 2.550 08/10/2020    TSH 2.230 10/14/2019      No results found for: \"FREET4\"   A1C Last 3 Results          2/1/2024    08:42 9/3/2024    08:45   HGBA1C Last 3 Results   Hemoglobin A1C 7.00  5.40                        Visit Diagnoses:    ICD-10-CM ICD-9-CM   1. Peripheral polyneuropathy  G62.9 356.9   2. IFG (impaired fasting glucose)  R73.01 790.21   3. Mixed hyperlipidemia  E78.2 272.2   4. Meniere disease, right  H81.01 386.00   5. Vitamin D deficiency  E55.9 268.9   6. Colon cancer screening  Z12.11 V76.51   7. Anxiety, generalized  F41.1 300.02   8. Elevated liver enzymes  R74.8 790.5   9. INDIRA on CPAP  G47.33 327.23   10. Acute cystitis without hematuria  N30.00 595.0 "       Assessment and Plan   Diagnoses and all orders for this visit:    1. Peripheral polyneuropathy (Primary)  -     Hemoglobin A1c; Future  -     Comprehensive Metabolic Panel; Future  -     Lipid Panel; Future  -     MicroAlbumin, Urine, Random - Urine, Clean Catch; Future    2. IFG (impaired fasting glucose)  -     Hemoglobin A1c; Future  -     Comprehensive Metabolic Panel; Future  -     Lipid Panel; Future  -     MicroAlbumin, Urine, Random - Urine, Clean Catch; Future    3. Mixed hyperlipidemia  -     Hemoglobin A1c; Future  -     Comprehensive Metabolic Panel; Future  -     Lipid Panel; Future  -     MicroAlbumin, Urine, Random - Urine, Clean Catch; Future    4. Meniere disease, right  -     Hemoglobin A1c; Future  -     Comprehensive Metabolic Panel; Future  -     Lipid Panel; Future  -     MicroAlbumin, Urine, Random - Urine, Clean Catch; Future    5. Vitamin D deficiency  -     Hemoglobin A1c; Future  -     Comprehensive Metabolic Panel; Future  -     Lipid Panel; Future  -     MicroAlbumin, Urine, Random - Urine, Clean Catch; Future    6. Colon cancer screening  -     Hemoglobin A1c; Future  -     Comprehensive Metabolic Panel; Future  -     Lipid Panel; Future  -     MicroAlbumin, Urine, Random - Urine, Clean Catch; Future    7. Anxiety, generalized  -     Hemoglobin A1c; Future  -     Comprehensive Metabolic Panel; Future  -     Lipid Panel; Future  -     MicroAlbumin, Urine, Random - Urine, Clean Catch; Future    8. Elevated liver enzymes  -     Hemoglobin A1c; Future  -     Comprehensive Metabolic Panel; Future  -     Lipid Panel; Future  -     MicroAlbumin, Urine, Random - Urine, Clean Catch; Future    9. INDIRA on CPAP  -     Hemoglobin A1c; Future  -     Comprehensive Metabolic Panel; Future  -     Lipid Panel; Future  -     MicroAlbumin, Urine, Random - Urine, Clean Catch; Future    10. Acute cystitis without hematuria  -     Urinalysis With Culture If Indicated -    Other orders  -     Tirzepatide  (MOUNJARO) 5 MG/0.5ML solution pen-injector pen; Inject 0.5 mL under the skin into the appropriate area as directed 1 (One) Time Per Week.  Dispense: 2 mL; Refill: 5        BMI is >= 25 and <30. (Overweight) The following options were offered after discussion;: weight loss educational material (shared in after visit summary), exercise counseling/recommendations, nutrition counseling/recommendations, and pharmacological intervention options     Annual wellness visit questions, February 12, 2024    Acute knee pain, patient's been to orthopedics for cortisone shots, and is wearing a brace at times,,  received another gel injection left knee  Dr. Theodore, much improved with weight loss September 2024    overwgt issues--- continues Mounjaro,5 milligrams subcu weekly since may 6, 2024, patient is lost about 40 pounds over the past 6 months with the medication and dietary interventions, as of September 10, 2024, will continue with the current dose    Diabetes type 2, ---hemoglobin A1c , down to 5.4 September 3, 2024,--- starting back on Mounjaro May 2024, urine microalbumin was normal, negative- February 1, 2024    Obstructive sleep apnea continues  CPAP machine, previous sleep study November 21, 2019 tolerating well,    Mixed hyperlipidemia, stopped  Crestor due to myalgias, March 2024    Anxiety, continues Cymbalta 30 mg daily     Ménière syndrome, continues Dyazide,     elevated liver enzymes, previous work-up March 2018 all negative ultrasound the liver showed some fatty replacement, still up slightly, February 1, 2024 marked improvement with weight loss efforts, dietary intervention Mounjaro etc., September 10, 2024    Vitamin D deficiency , continue vitamin D, aerobic and weightbearing exercises,     Bone density August 1, 2022 shows osteopenia of the lumbar and hip area, T-scores of  Neg 0.8 and 1.0 ,    Colonoscopy polyps present-- Dr. Belcher in November 2019 -July 3, 2023 colonoscopy, patient had multiple  diverticuli but no polyps, otherwise unremarkable    Allergies, continues immunotherapy injections,, weekly      Mammogram October 2024    Follow Up   Return in about 6 months (around 3/10/2025).  Patient was given instructions and counseling regarding her condition or for health maintenance advice. Please see specific information pulled into the AVS if appropriate.           Answers submitted by the patient for this visit:  Other (Submitted on 9/3/2024)  Please describe your symptoms.: Follow up  Have you had these symptoms before?: No  How long have you been having these symptoms?: Greater than 2 weeks  Please list any medications you are currently taking for this condition.: No change  Please describe any probable cause for these symptoms. : No change  Primary Reason for Visit (Submitted on 9/3/2024)  What is the primary reason for your visit?: Other

## 2024-09-11 ENCOUNTER — LAB (OUTPATIENT)
Dept: LAB | Facility: HOSPITAL | Age: 74
End: 2024-09-11
Payer: MEDICARE

## 2024-09-11 DIAGNOSIS — Z12.11 COLON CANCER SCREENING: ICD-10-CM

## 2024-09-11 DIAGNOSIS — H81.01 MENIERE DISEASE, RIGHT: ICD-10-CM

## 2024-09-11 DIAGNOSIS — G47.33 OSA ON CPAP: ICD-10-CM

## 2024-09-11 DIAGNOSIS — F41.1 ANXIETY, GENERALIZED: ICD-10-CM

## 2024-09-11 DIAGNOSIS — E78.2 MIXED HYPERLIPIDEMIA: ICD-10-CM

## 2024-09-11 DIAGNOSIS — G62.9 PERIPHERAL POLYNEUROPATHY: ICD-10-CM

## 2024-09-11 DIAGNOSIS — R73.01 IFG (IMPAIRED FASTING GLUCOSE): ICD-10-CM

## 2024-09-11 DIAGNOSIS — R74.8 ELEVATED LIVER ENZYMES: ICD-10-CM

## 2024-09-11 DIAGNOSIS — E55.9 VITAMIN D DEFICIENCY: ICD-10-CM

## 2024-09-11 LAB
BACTERIA UR QL AUTO: ABNORMAL /HPF
BILIRUB UR QL STRIP: NEGATIVE
CLARITY UR: CLEAR
COLOR UR: YELLOW
GLUCOSE UR STRIP-MCNC: NEGATIVE MG/DL
HGB UR QL STRIP.AUTO: NEGATIVE
HOLD SPECIMEN: NORMAL
HYALINE CASTS UR QL AUTO: ABNORMAL /LPF
KETONES UR QL STRIP: NEGATIVE
LEUKOCYTE ESTERASE UR QL STRIP.AUTO: ABNORMAL
NITRITE UR QL STRIP: NEGATIVE
PH UR STRIP.AUTO: 7 [PH] (ref 5–8)
PROT UR QL STRIP: NEGATIVE
RBC # UR STRIP: ABNORMAL /HPF
REF LAB TEST METHOD: ABNORMAL
SP GR UR STRIP: 1.01 (ref 1–1.03)
SQUAMOUS #/AREA URNS HPF: ABNORMAL /HPF
UROBILINOGEN UR QL STRIP: ABNORMAL
WBC # UR STRIP: ABNORMAL /HPF

## 2024-09-11 PROCEDURE — 81001 URINALYSIS AUTO W/SCOPE: CPT | Performed by: INTERNAL MEDICINE

## 2024-09-20 ENCOUNTER — TELEPHONE (OUTPATIENT)
Dept: INTERNAL MEDICINE | Age: 74
End: 2024-09-20
Payer: MEDICARE

## 2024-09-20 RX ORDER — PHENAZOPYRIDINE HYDROCHLORIDE 100 MG/1
100 TABLET, FILM COATED ORAL 3 TIMES DAILY PRN
Qty: 30 TABLET | Refills: 0 | Status: SHIPPED | OUTPATIENT
Start: 2024-09-20

## 2024-09-20 RX ORDER — CIPROFLOXACIN 500 MG/1
500 TABLET, FILM COATED ORAL 2 TIMES DAILY
Qty: 14 TABLET | Refills: 0 | Status: SHIPPED | OUTPATIENT
Start: 2024-09-20

## 2024-10-24 ENCOUNTER — HOSPITAL ENCOUNTER (OUTPATIENT)
Dept: MAMMOGRAPHY | Facility: HOSPITAL | Age: 74
Discharge: HOME | End: 2024-10-24
Admitting: INTERNAL MEDICINE
Payer: MEDICARE

## 2024-10-24 DIAGNOSIS — Z12.31 ENCOUNTER FOR SCREENING MAMMOGRAM FOR HIGH-RISK PATIENT: ICD-10-CM

## 2024-10-24 PROCEDURE — 77063 BREAST TOMOSYNTHESIS BI: CPT

## 2024-10-24 PROCEDURE — 77067 SCR MAMMO BI INCL CAD: CPT

## 2024-10-25 ENCOUNTER — TELEPHONE (OUTPATIENT)
Dept: INTERNAL MEDICINE | Age: 74
End: 2024-10-25
Payer: MEDICARE

## 2024-10-25 DIAGNOSIS — R92.8 ABNORMAL MAMMOGRAM OF RIGHT BREAST: Primary | ICD-10-CM

## 2024-10-25 NOTE — TELEPHONE ENCOUNTER
Call patient tell her there is a 1 cm asymmetry in the right breast in the upper area that needs a follow-up additional mammogram and ultrasound, I have already put those orders in the system but make sure she gets a call from the hospital to get that done and that we follow-up on this after those results are completed

## 2024-11-07 ENCOUNTER — HOSPITAL ENCOUNTER (OUTPATIENT)
Dept: MAMMOGRAPHY | Facility: HOSPITAL | Age: 74
Discharge: HOME | End: 2024-11-07
Payer: MEDICARE

## 2024-11-07 ENCOUNTER — HOSPITAL ENCOUNTER (OUTPATIENT)
Dept: ULTRASOUND IMAGING | Facility: HOSPITAL | Age: 74
Discharge: HOME | End: 2024-11-07
Payer: MEDICARE

## 2024-11-07 DIAGNOSIS — R92.8 ABNORMAL MAMMOGRAM OF RIGHT BREAST: ICD-10-CM

## 2024-11-07 PROCEDURE — 77065 DX MAMMO INCL CAD UNI: CPT

## 2024-11-07 PROCEDURE — 76642 ULTRASOUND BREAST LIMITED: CPT

## 2024-11-07 PROCEDURE — G0279 TOMOSYNTHESIS, MAMMO: HCPCS

## 2025-01-24 RX ORDER — TRIAMTERENE AND HYDROCHLOROTHIAZIDE 37.5; 25 MG/1; MG/1
1 CAPSULE ORAL DAILY
Qty: 90 CAPSULE | Refills: 3 | Status: SHIPPED | OUTPATIENT
Start: 2025-01-24

## 2025-01-24 NOTE — TELEPHONE ENCOUNTER
Caller: Alison Mackenzie    Relationship: Self    Best call back number: [phone]    Requested Prescriptions:   Requested Prescriptions     Pending Prescriptions Disp Refills    triamterene-hydrochlorothiazide (DYAZIDE) 37.5-25 MG per capsule 90 capsule 3     Sig: Take 1 capsule by mouth Daily.        Pharmacy where request should be sent: [id number] [address] [place]  [phone] [facility] [phone]      Last office visit with prescribing clinician: 9/10/2024   Last telemedicine visit with prescribing clinician: Visit date not found   Next office visit with prescribing clinician: 3/14/2025     Does the patient have less than a 3 day supply:  [x] Yes  [] No    Would you like a call back once the refill request has been completed: [] Yes [x] No    If the office needs to give you a call back, can they leave a voicemail: [] Yes [x] No    Fredi Prieto Rep   01/24/25 13:05 EST

## 2025-03-04 ENCOUNTER — LAB (OUTPATIENT)
Dept: LAB | Facility: HOSPITAL | Age: 75
End: 2025-03-04
Payer: MEDICARE

## 2025-03-04 ENCOUNTER — TELEPHONE (OUTPATIENT)
Dept: INTERNAL MEDICINE | Age: 75
End: 2025-03-04
Payer: MEDICARE

## 2025-03-04 DIAGNOSIS — R73.01 IFG (IMPAIRED FASTING GLUCOSE): ICD-10-CM

## 2025-03-04 DIAGNOSIS — E78.2 MIXED HYPERLIPIDEMIA: Primary | ICD-10-CM

## 2025-03-04 DIAGNOSIS — E78.2 MIXED HYPERLIPIDEMIA: ICD-10-CM

## 2025-03-04 DIAGNOSIS — R74.8 ELEVATED LIVER ENZYMES: ICD-10-CM

## 2025-03-04 LAB
ALBUMIN SERPL-MCNC: 4 G/DL (ref 3.5–5.2)
ALBUMIN/GLOB SERPL: 1.7 G/DL
ALP SERPL-CCNC: 98 U/L (ref 39–117)
ALT SERPL W P-5'-P-CCNC: 10 U/L (ref 1–33)
ANION GAP SERPL CALCULATED.3IONS-SCNC: 11 MMOL/L (ref 5–15)
AST SERPL-CCNC: 21 U/L (ref 1–32)
BASOPHILS # BLD AUTO: 0.07 10*3/MM3 (ref 0–0.2)
BASOPHILS NFR BLD AUTO: 0.8 % (ref 0–1.5)
BILIRUB SERPL-MCNC: 0.5 MG/DL (ref 0–1.2)
BUN SERPL-MCNC: 14 MG/DL (ref 8–23)
BUN/CREAT SERPL: 15.6 (ref 7–25)
CALCIUM SPEC-SCNC: 9.8 MG/DL (ref 8.6–10.5)
CHLORIDE SERPL-SCNC: 102 MMOL/L (ref 98–107)
CHOLEST SERPL-MCNC: 233 MG/DL (ref 0–200)
CO2 SERPL-SCNC: 28 MMOL/L (ref 22–29)
CREAT SERPL-MCNC: 0.9 MG/DL (ref 0.57–1)
DEPRECATED RDW RBC AUTO: 39.8 FL (ref 37–54)
EGFRCR SERPLBLD CKD-EPI 2021: 67.6 ML/MIN/1.73
EOSINOPHIL # BLD AUTO: 0.19 10*3/MM3 (ref 0–0.4)
EOSINOPHIL NFR BLD AUTO: 2.2 % (ref 0.3–6.2)
ERYTHROCYTE [DISTWIDTH] IN BLOOD BY AUTOMATED COUNT: 12.1 % (ref 12.3–15.4)
GLOBULIN UR ELPH-MCNC: 2.4 GM/DL
GLUCOSE SERPL-MCNC: 97 MG/DL (ref 65–99)
HBA1C MFR BLD: 5.6 % (ref 4.8–5.6)
HCT VFR BLD AUTO: 45.6 % (ref 34–46.6)
HDLC SERPL-MCNC: 47 MG/DL (ref 40–60)
HGB BLD-MCNC: 15.7 G/DL (ref 12–15.9)
IMM GRANULOCYTES # BLD AUTO: 0.03 10*3/MM3 (ref 0–0.05)
IMM GRANULOCYTES NFR BLD AUTO: 0.4 % (ref 0–0.5)
LDLC SERPL CALC-MCNC: 160 MG/DL (ref 0–100)
LDLC/HDLC SERPL: 3.34 {RATIO}
LYMPHOCYTES # BLD AUTO: 2.07 10*3/MM3 (ref 0.7–3.1)
LYMPHOCYTES NFR BLD AUTO: 24.4 % (ref 19.6–45.3)
MCH RBC QN AUTO: 31.4 PG (ref 26.6–33)
MCHC RBC AUTO-ENTMCNC: 34.4 G/DL (ref 31.5–35.7)
MCV RBC AUTO: 91.2 FL (ref 79–97)
MONOCYTES # BLD AUTO: 0.62 10*3/MM3 (ref 0.1–0.9)
MONOCYTES NFR BLD AUTO: 7.3 % (ref 5–12)
NEUTROPHILS NFR BLD AUTO: 5.5 10*3/MM3 (ref 1.7–7)
NEUTROPHILS NFR BLD AUTO: 64.9 % (ref 42.7–76)
NRBC BLD AUTO-RTO: 0 /100 WBC (ref 0–0.2)
PLATELET # BLD AUTO: 309 10*3/MM3 (ref 140–450)
PMV BLD AUTO: 11.4 FL (ref 6–12)
POTASSIUM SERPL-SCNC: 4.6 MMOL/L (ref 3.5–5.2)
PROT SERPL-MCNC: 6.4 G/DL (ref 6–8.5)
RBC # BLD AUTO: 5 10*6/MM3 (ref 3.77–5.28)
SODIUM SERPL-SCNC: 141 MMOL/L (ref 136–145)
TRIGL SERPL-MCNC: 144 MG/DL (ref 0–150)
VLDLC SERPL-MCNC: 26 MG/DL (ref 5–40)
WBC NRBC COR # BLD AUTO: 8.48 10*3/MM3 (ref 3.4–10.8)

## 2025-03-04 PROCEDURE — 85025 COMPLETE CBC W/AUTO DIFF WBC: CPT

## 2025-03-04 PROCEDURE — 83036 HEMOGLOBIN GLYCOSYLATED A1C: CPT

## 2025-03-04 PROCEDURE — 80061 LIPID PANEL: CPT

## 2025-03-04 PROCEDURE — 36415 COLL VENOUS BLD VENIPUNCTURE: CPT

## 2025-03-04 PROCEDURE — 80053 COMPREHEN METABOLIC PANEL: CPT

## 2025-03-14 ENCOUNTER — OFFICE VISIT (OUTPATIENT)
Dept: INTERNAL MEDICINE | Age: 75
End: 2025-03-14
Payer: MEDICARE

## 2025-03-14 VITALS
WEIGHT: 174 LBS | BODY MASS INDEX: 27.97 KG/M2 | DIASTOLIC BLOOD PRESSURE: 75 MMHG | SYSTOLIC BLOOD PRESSURE: 128 MMHG | HEART RATE: 82 BPM | TEMPERATURE: 98.4 F | HEIGHT: 66 IN | OXYGEN SATURATION: 98 %

## 2025-03-14 DIAGNOSIS — Z00.00 MEDICARE ANNUAL WELLNESS VISIT, SUBSEQUENT: Primary | ICD-10-CM

## 2025-03-14 DIAGNOSIS — H93.13 TINNITUS OF BOTH EARS: ICD-10-CM

## 2025-03-14 DIAGNOSIS — M81.0 OSTEOPOROSIS, UNSPECIFIED OSTEOPOROSIS TYPE, UNSPECIFIED PATHOLOGICAL FRACTURE PRESENCE: ICD-10-CM

## 2025-03-14 DIAGNOSIS — F41.1 ANXIETY, GENERALIZED: ICD-10-CM

## 2025-03-14 DIAGNOSIS — G47.33 OSA ON CPAP: ICD-10-CM

## 2025-03-14 DIAGNOSIS — F33.1 MAJOR DEPRESSIVE DISORDER, RECURRENT, MODERATE: ICD-10-CM

## 2025-03-14 DIAGNOSIS — G62.9 PERIPHERAL POLYNEUROPATHY: ICD-10-CM

## 2025-03-14 DIAGNOSIS — R73.01 IFG (IMPAIRED FASTING GLUCOSE): ICD-10-CM

## 2025-03-14 DIAGNOSIS — E78.2 MIXED HYPERLIPIDEMIA: ICD-10-CM

## 2025-03-14 DIAGNOSIS — Z12.11 COLON CANCER SCREENING: ICD-10-CM

## 2025-03-14 DIAGNOSIS — R74.8 ELEVATED LIVER ENZYMES: ICD-10-CM

## 2025-03-14 DIAGNOSIS — H81.01 MENIERE DISEASE, RIGHT: ICD-10-CM

## 2025-03-14 DIAGNOSIS — E55.9 VITAMIN D DEFICIENCY: ICD-10-CM

## 2025-03-14 RX ORDER — BUPROPION HYDROCHLORIDE 150 MG/1
150 TABLET ORAL DAILY
Qty: 90 TABLET | Refills: 3 | Status: SHIPPED | OUTPATIENT
Start: 2025-03-14

## 2025-03-14 RX ORDER — SIMVASTATIN 20 MG
20 TABLET ORAL NIGHTLY
Qty: 90 TABLET | Refills: 3 | Status: SHIPPED | OUTPATIENT
Start: 2025-03-14

## 2025-03-14 NOTE — PROGRESS NOTES
"Chief Complaint   Patient presents with    Medicare Wellness-subsequent     Welllness visit, Lab follow up. No concerns today.    Patient is here to follow-up with history of tinnitus, allergies, says that she could not take the Cymbalta made her too sleepy during the day,  And says she has been a little bit more depressed coming off Cymbalta would consider trying a different medication,  Objective   Vital Signs  Vitals:    03/14/25 0848   BP: 128/75   BP Location: Right arm   Patient Position: Sitting   Pulse: 82   Temp: 98.4 °F (36.9 °C)   SpO2: 98%   Weight: 78.9 kg (174 lb)   Height: 167.6 cm (65.98\")      Body mass index is 28.1 kg/m².  Review of Systems   Constitutional: Negative.  Negative for chills, diaphoresis, fatigue and fever.   HENT: Negative.  Negative for congestion, sore throat and swollen glands.    Eyes: Negative.    Respiratory: Negative.  Negative for cough.    Cardiovascular: Negative.  Negative for chest pain.   Gastrointestinal: Negative.  Negative for abdominal pain, nausea and vomiting.   Endocrine: Negative.    Genitourinary: Negative.  Negative for dysuria.   Musculoskeletal: Negative.  Negative for myalgias and neck pain.   Skin:  Negative for rash.   Allergic/Immunologic: Negative.    Neurological: Negative.  Negative for weakness and numbness.   Hematological: Negative.    Psychiatric/Behavioral: Negative.        Physical Exam  Constitutional:       General: She is not in acute distress.     Appearance: Normal appearance.   HENT:      Head: Normocephalic.      Mouth/Throat:      Mouth: Mucous membranes are moist.   Eyes:      Conjunctiva/sclera: Conjunctivae normal.      Pupils: Pupils are equal, round, and reactive to light.   Cardiovascular:      Rate and Rhythm: Normal rate and regular rhythm.      Pulses: Normal pulses.      Heart sounds: Normal heart sounds.   Pulmonary:      Effort: Pulmonary effort is normal.      Breath sounds: Normal breath sounds.   Abdominal:      General: " "Abdomen is flat. Bowel sounds are normal.      Palpations: Abdomen is soft.   Musculoskeletal:         General: No swelling. Normal range of motion.      Cervical back: Neck supple.   Skin:     General: Skin is warm and dry.      Coloration: Skin is not jaundiced.   Neurological:      General: No focal deficit present.      Mental Status: She is alert and oriented to person, place, and time. Mental status is at baseline.   Psychiatric:         Mood and Affect: Mood normal.         Behavior: Behavior normal.         Thought Content: Thought content normal.         Judgment: Judgment normal.        Result Review :   No results found for: \"PROBNP\", \"BNP\"  CMP          9/3/2024    08:45 3/4/2025    08:46   CMP   Glucose 85  97    BUN 15  14    Creatinine 0.96  0.90    EGFR 63.0  67.6    Sodium 140  141    Potassium 4.4  4.6    Chloride 101  102    Calcium 9.8  9.8    Total Protein 7.2  6.4    Albumin 4.2  4.0    Globulin 3.0  2.4    Total Bilirubin 0.6  0.5    Alkaline Phosphatase 93  98    AST (SGOT) 31  21    ALT (SGPT) 25  10    Albumin/Globulin Ratio 1.4  1.7    BUN/Creatinine Ratio 15.6  15.6    Anion Gap 12.9  11.0      CBC w/diff          9/3/2024    08:45 3/4/2025    08:46   CBC w/Diff   WBC 9.22  8.48    RBC 4.95  5.00    Hemoglobin 15.4  15.7    Hematocrit 45.4  45.6    MCV 91.7  91.2    MCH 31.1  31.4    MCHC 33.9  34.4    RDW 12.2  12.1    Platelets 315  309    Neutrophil Rel % 67.2  64.9    Immature Granulocyte Rel % 0.2  0.4    Lymphocyte Rel % 22.0  24.4    Monocyte Rel % 7.8  7.3    Eosinophil Rel % 2.1  2.2    Basophil Rel % 0.7  0.8       Lipid Panel          9/3/2024    08:45 3/4/2025    08:46   Lipid Panel   Total Cholesterol 203  233    Triglycerides 156  144    HDL Cholesterol 37  47    VLDL Cholesterol 28  26    LDL Cholesterol  138  160    LDL/HDL Ratio 3.64  3.34       Lab Results   Component Value Date    TSH 2.550 08/10/2020    TSH 2.230 10/14/2019      No results found for: \"FREET4\"   A1C " Last 3 Results          9/3/2024    08:45 3/4/2025    08:46   HGBA1C Last 3 Results   Hemoglobin A1C 5.40  5.60                        Visit Diagnoses:    ICD-10-CM ICD-9-CM   1. Medicare annual wellness visit, subsequent  Z00.00 V70.0   2. Tinnitus of both ears  H93.13 388.30   3. Mixed hyperlipidemia  E78.2 272.2   4. Meniere disease, right  H81.01 386.00   5. IFG (impaired fasting glucose)  R73.01 790.21   6. Colon cancer screening  Z12.11 V76.51   7. Vitamin D deficiency  E55.9 268.9   8. Osteoporosis, unspecified osteoporosis type, unspecified pathological fracture presence  M81.0 733.00   9. INDIRA on CPAP  G47.33 327.23   10. Peripheral polyneuropathy  G62.9 356.9   11. Anxiety, generalized  F41.1 300.02   12. Elevated liver enzymes  R74.8 790.5   13. Major depressive disorder, recurrent, moderate  F33.1 296.32       Assessment and Plan   Diagnoses and all orders for this visit:    1. Medicare annual wellness visit, subsequent (Primary)  Assessment & Plan:           Orders:  -     Microalbumin / Creatinine Urine Ratio - Urine, Clean Catch  -     Comprehensive Metabolic Panel; Future  -     CBC & Differential; Future  -     Lipid Panel; Future  -     Hemoglobin A1c; Future    2. Tinnitus of both ears  Assessment & Plan:           Orders:  -     Microalbumin / Creatinine Urine Ratio - Urine, Clean Catch  -     Comprehensive Metabolic Panel; Future  -     CBC & Differential; Future  -     Lipid Panel; Future  -     Hemoglobin A1c; Future    3. Mixed hyperlipidemia  Assessment & Plan:   Lipid abnormalities are stable    Plan:  Continue same medication/s without change.      Discussed medication dosage, use, side effects, and goals of treatment in detail.    Counseled patient on lifestyle modifications to help control hyperlipidemia.     Patient Treatment Goals:   LDL goal is less than 70  LDL goal is under 100    Followup in 6 months.           Orders:  -     Microalbumin / Creatinine Urine Ratio - Urine, Clean  Catch  -     Comprehensive Metabolic Panel; Future  -     CBC & Differential; Future  -     Lipid Panel; Future  -     Hemoglobin A1c; Future    4. Meniere disease, right  Assessment & Plan:           Orders:  -     Microalbumin / Creatinine Urine Ratio - Urine, Clean Catch  -     Comprehensive Metabolic Panel; Future  -     CBC & Differential; Future  -     Lipid Panel; Future  -     Hemoglobin A1c; Future    5. IFG (impaired fasting glucose)  Assessment & Plan:           Orders:  -     Microalbumin / Creatinine Urine Ratio - Urine, Clean Catch  -     Comprehensive Metabolic Panel; Future  -     CBC & Differential; Future  -     Lipid Panel; Future  -     Hemoglobin A1c; Future    6. Colon cancer screening  Assessment & Plan:           Orders:  -     Microalbumin / Creatinine Urine Ratio - Urine, Clean Catch  -     Comprehensive Metabolic Panel; Future  -     CBC & Differential; Future  -     Lipid Panel; Future  -     Hemoglobin A1c; Future    7. Vitamin D deficiency  Assessment & Plan:           Orders:  -     Microalbumin / Creatinine Urine Ratio - Urine, Clean Catch  -     Comprehensive Metabolic Panel; Future  -     CBC & Differential; Future  -     Lipid Panel; Future  -     Hemoglobin A1c; Future    8. Osteoporosis, unspecified osteoporosis type, unspecified pathological fracture presence  Assessment & Plan:           Orders:  -     Microalbumin / Creatinine Urine Ratio - Urine, Clean Catch  -     Comprehensive Metabolic Panel; Future  -     CBC & Differential; Future  -     Lipid Panel; Future  -     Hemoglobin A1c; Future    9. INDIRA on CPAP  Assessment & Plan:           Orders:  -     Microalbumin / Creatinine Urine Ratio - Urine, Clean Catch  -     Comprehensive Metabolic Panel; Future  -     CBC & Differential; Future  -     Lipid Panel; Future  -     Hemoglobin A1c; Future    10. Peripheral polyneuropathy  Assessment & Plan:           Orders:  -     Microalbumin / Creatinine Urine Ratio - Urine, Clean  Catch  -     Comprehensive Metabolic Panel; Future  -     CBC & Differential; Future  -     Lipid Panel; Future  -     Hemoglobin A1c; Future    11. Anxiety, generalized  Assessment & Plan:  Psychological condition is stable.  Continue current treatment regimen.  Psychological condition  will be reassessed in 6 months.           Orders:  -     Microalbumin / Creatinine Urine Ratio - Urine, Clean Catch  -     Comprehensive Metabolic Panel; Future  -     CBC & Differential; Future  -     Lipid Panel; Future  -     Hemoglobin A1c; Future    12. Elevated liver enzymes  Assessment & Plan:           Orders:  -     Microalbumin / Creatinine Urine Ratio - Urine, Clean Catch  -     Comprehensive Metabolic Panel; Future  -     CBC & Differential; Future  -     Lipid Panel; Future  -     Hemoglobin A1c; Future    13. Major depressive disorder, recurrent, moderate    Other orders  -     simvastatin (Zocor) 20 MG tablet; Take 1 tablet by mouth Every Night.  Dispense: 90 tablet; Refill: 3  -     buPROPion XL (Wellbutrin XL) 150 MG 24 hr tablet; Take 1 tablet by mouth Daily.  Dispense: 90 tablet; Refill: 3      Medicare wellness completed March 14, 2025    Acute knee pain,  orthopedics for cortisone shots,  received another gel injection left knee  Dr. Theodore, much improved with weight loss September 2024    overwgt issues--- continues Mounjaro,5 milligrams subcu weekly since may 6, 2024, patient is lost about 40 pounds over the past 6 months with the medication and dietary interventions, as of September 10, 2024, doing fairly well has come off of Mounjaro as of October 2024,    Diabetes type 2, ---hemoglobin A1c , down to 5.6, March 2025    Obstructive sleep apnea continues  CPAP machine, previous sleep study November 21, 2019 tolerating well,    Mixed hyperlipidemia, stopped  Crestor due to myalgias, March 2024 would consider Zocor 10 to 20 mg daily=== March 2025 will restart    Anxiety,, depression, will start Wellbutrin XL  150 mg daily March 14, 2025 patient had to stop taking Cymbalta due to drowsiness, October 2025    Ménière syndrome, continues Dyazide, discussed continued use she is may be able to cut back or take as needed March 2025    elevated liver enzymes, previous work-up March 2018 all negative ultrasound the liver showed some fatty replacement, still up slightly, February 1, 2024 marked improvement with weight loss efforts, dietary intervention Mounjaro etc., September 10, 2024    Vitamin D deficiency , continue vitamin D, aerobic and weightbearing exercises,     Bone density August 1, 2022 shows osteopenia of the lumbar and hip area, T-scores of  Neg 0.8 and 1.0 ,    Colonoscopy polyps present-- Dr. Belcher in November 2019 -July 3, 2023 colonoscopy, patient had multiple diverticuli but no polyps, otherwise unremarkable    Allergies, continues immunotherapy injections,, weekly      Mammogram October 2024                 Follow Up   No follow-ups on file.  Patient was given instructions and counseling regarding her condition or for health maintenance advice. Please see specific information pulled into the AVS if appropriate.           Answers submitted by the patient for this visit:  Problem not listed (Submitted on 3/14/2025)  Chief Complaint: Other medical problem  Reason for appointment: Check up  anorexia: No  joint pain: No  change in stool: No  headaches: No  joint swelling: No  vertigo: No  visual change: No  Medications tried: None  Additional information: None

## 2025-03-14 NOTE — PROGRESS NOTES
Subjective   The ABCs of the Annual Wellness Visit  Medicare Wellness Visit      Alison Mackenzie is a 74 y.o. patient who presents for a Medicare Wellness Visit.    The following portions of the patient's history were reviewed and   updated as appropriate: allergies, current medications, past family history, past medical history, past social history, past surgical history, and problem list.    Compared to one year ago, the patient's physical   health is better.  Compared to one year ago, the patient's mental   health is better.    Recent Hospitalizations:  She was not admitted to the hospital during the last year.     Current Medical Providers:  Patient Care Team:  Watson Johansen MD as PCP - General (Internal Medicine)    Outpatient Medications Prior to Visit   Medication Sig Dispense Refill    albuterol sulfate  (90 Base) MCG/ACT inhaler 2 PUFFS EVERY 4 HOURS AS NEEDED FOR COUGH, WHEEZE OR SHORTNESS OF BREATH. USE WITH VORTEX SPACER.      ALLERGY INJECTION SERUM OFFICE ADMINISTERED       azelastine (ASTELIN) 0.1 % nasal spray 2 sprays into the nostril(s) as directed by provider 2 (Two) Times a Day. Use in each nostril as directed 30 mL 0    cetirizine (zyrTEC) 10 MG tablet Take 1 tablet by mouth Daily.      cholecalciferol (VITAMIN D3) 25 MCG (1000 UT) tablet Take 1 tablet by mouth Daily.      montelukast (SINGULAIR) 10 MG tablet Take 1 tablet by mouth Every Night.      triamterene-hydrochlorothiazide (DYAZIDE) 37.5-25 MG per capsule Take 1 capsule by mouth Daily. 90 capsule 3    ciprofloxacin (Cipro) 500 MG tablet Take 1 tablet by mouth 2 (Two) Times a Day. (Patient not taking: Reported on 3/14/2025) 14 tablet 0    DULoxetine (Cymbalta) 30 MG capsule Take 1 capsule by mouth Daily. (Patient not taking: Reported on 3/14/2025) 90 capsule 3    phenazopyridine (Pyridium) 100 MG tablet Take 1 tablet by mouth 3 (Three) Times a Day As Needed for Bladder Spasms. 30 tablet 0    Tirzepatide (MOUNJARO) 5  "MG/0.5ML solution pen-injector pen Inject 0.5 mL under the skin into the appropriate area as directed 1 (One) Time Per Week. (Patient not taking: Reported on 3/14/2025) 2 mL 5     No facility-administered medications prior to visit.     No opioid medication identified on active medication list. I have reviewed chart for other potential  high risk medication/s and harmful drug interactions in the elderly.      Aspirin is not on active medication list.  Aspirin use is not indicated based on review of current medical condition/s. Risk of harm outweighs potential benefits.  .    Patient Active Problem List   Diagnosis    Tinnitus of both ears    Mixed hyperlipidemia    IFG (impaired fasting glucose)    Elevated liver enzymes    Vitamin D deficiency    Meniere disease, right    INDIRA on CPAP    Osteoporosis    Middle ear effusion, bilateral    Mixed hyperlipidemia    Sleep apnea    Vitamin D deficiency    Abnormal mammogram    Peripheral polyneuropathy    Back pain at L4-L5 level    Obesity    Anxiety, generalized    Colon cancer screening    Acute pain of left knee    Medicare annual wellness visit, subsequent    Acute cystitis without hematuria    Abnormal mammogram of right breast     Advance Care Planning Advance Directive is not on file.  ACP discussion was held with the patient during this visit. Patient has an advance directive (not in EMR), copy requested.            Objective   Vitals:    03/14/25 0848   BP: 128/75   BP Location: Right arm   Patient Position: Sitting   Pulse: 82   Temp: 98.4 °F (36.9 °C)   SpO2: 98%   Weight: 78.9 kg (174 lb)   Height: 167.6 cm (65.98\")   PainSc: 0-No pain       Estimated body mass index is 28.1 kg/m² as calculated from the following:    Height as of this encounter: 167.6 cm (65.98\").    Weight as of this encounter: 78.9 kg (174 lb).                Does the patient have evidence of cognitive impairment? No  Lab Results   Component Value Date    TRIG 144 03/04/2025    HDL 47 " 03/04/2025     (H) 03/04/2025    VLDL 26 03/04/2025    HGBA1C 5.60 03/04/2025                                                                                               Health  Risk Assessment    Smoking Status:  Social History     Tobacco Use   Smoking Status Never   Smokeless Tobacco Never     Alcohol Consumption:  Social History     Substance and Sexual Activity   Alcohol Use Never       Fall Risk Screen  STEADI Fall Risk Assessment was completed, and patient is at LOW risk for falls.Assessment completed on:3/14/2025    Depression Screening   Little interest or pleasure in doing things? Not at all   Feeling down, depressed, or hopeless? Several days   PHQ-2 Total Score 1      Health Habits and Functional and Cognitive Screening:      3/14/2025     8:00 AM   Functional & Cognitive Status   Do you have difficulty preparing food and eating? No   Do you have difficulty bathing yourself, getting dressed or grooming yourself? No   Do you have difficulty using the toilet? No   Do you have difficulty moving around from place to place? No   Do you have trouble with steps or getting out of a bed or a chair? No   Current Diet Well Balanced Diet   Dental Exam Up to date   Eye Exam Up to date   Exercise (times per week) 5 times per week   Current Exercises Include Walking   Do you need help using the phone?  No   Are you deaf or do you have serious difficulty hearing?  No   Do you need help to go to places out of walking distance? No   Do you need help shopping? No   Do you need help preparing meals?  No   Do you need help with housework?  No   Do you need help with laundry? No   Do you need help taking your medications? No   Do you need help managing money? No   Do you ever drive or ride in a car without wearing a seat belt? No   Have you felt unusual stress, anger or loneliness in the last month? No   Who do you live with? Spouse   If you need help, do you have trouble finding someone available to you? No   Have  you been bothered in the last four weeks by sexual problems? No   Do you have difficulty concentrating, remembering or making decisions? No           Age-appropriate Screening Schedule:  Refer to the list below for future screening recommendations based on patient's age, sex and/or medical conditions. Orders for these recommended tests are listed in the plan section. The patient has been provided with a written plan.    Health Maintenance List  Health Maintenance   Topic Date Due    DIABETIC FOOT EXAM  Never done    URINE MICROALBUMIN-CREATININE RATIO (uACR)  Never done    TDAP/TD VACCINES (1 - Tdap) Never done    DXA SCAN  08/01/2024    COVID-19 Vaccine (4 - 2024-25 season) 09/01/2024    DIABETIC EYE EXAM  03/13/2025    HEMOGLOBIN A1C  09/04/2025    BMI FOLLOWUP  09/10/2025    LIPID PANEL  03/04/2026    ANNUAL WELLNESS VISIT  03/14/2026    MAMMOGRAM  11/07/2026    COLORECTAL CANCER SCREENING  07/03/2028    HEPATITIS C SCREENING  Completed    INFLUENZA VACCINE  Completed    Pneumococcal Vaccine 50+  Completed    ZOSTER VACCINE  Completed                                                                                                                                                CMS Preventative Services Quick Reference  Risk Factors Identified During Encounter  None Identified    The above risks/problems have been discussed with the patient.  Pertinent information has been shared with the patient in the After Visit Summary.  An After Visit Summary and PPPS were made available to the patient.    Follow Up:   Next Medicare Wellness visit to be scheduled in 1 year.     Assessment & Plan  Medicare annual wellness visit, subsequent         Tinnitus of both ears         Mixed hyperlipidemia   Lipid abnormalities are stable    Plan:  Continue same medication/s without change.      Discussed medication dosage, use, side effects, and goals of treatment in detail.    Counseled patient on lifestyle modifications to help  control hyperlipidemia.     Patient Treatment Goals:   LDL goal is less than 70  LDL goal is under 100    Followup in 6 months.         Meniere disease, right         IFG (impaired fasting glucose)         Colon cancer screening         Vitamin D deficiency         Osteoporosis, unspecified osteoporosis type, unspecified pathological fracture presence         INDIRA on CPAP         Peripheral polyneuropathy         Anxiety, generalized  Psychological condition is stable.  Continue current treatment regimen.  Psychological condition  will be reassessed in 6 months.         Elevated liver enzymes              Follow Up:   No follow-ups on file.        Answers submitted by the patient for this visit:  Problem not listed (Submitted on 3/14/2025)  Chief Complaint: Other medical problem  Reason for appointment: Check up  abdominal pain: No  anorexia: No  joint pain: No  change in stool: No  chest pain: No  chills: No  nasal congestion: No  cough: No  diaphoresis: No  fatigue: No  fever: No  headaches: No  joint swelling: No  myalgias: No  nausea: No  neck pain: No  numbness: No  rash: No  sore throat: No  swollen glands: No  dysuria: No  vertigo: No  visual change: No  vomiting: No  weakness: No  Medications tried: None  Additional information: None

## 2025-03-14 NOTE — ASSESSMENT & PLAN NOTE
Lipid abnormalities are stable    Plan:  Continue same medication/s without change.      Discussed medication dosage, use, side effects, and goals of treatment in detail.    Counseled patient on lifestyle modifications to help control hyperlipidemia.     Patient Treatment Goals:   LDL goal is less than 70  LDL goal is under 100    Followup in 6 months.

## 2025-03-18 ENCOUNTER — LAB (OUTPATIENT)
Dept: LAB | Facility: HOSPITAL | Age: 75
End: 2025-03-18
Payer: MEDICARE

## 2025-03-18 LAB
ALBUMIN UR-MCNC: <1.2 MG/DL
CREAT UR-MCNC: 53.5 MG/DL
MICROALBUMIN/CREAT UR: NORMAL MG/G{CREAT}

## 2025-03-18 PROCEDURE — 82570 ASSAY OF URINE CREATININE: CPT | Performed by: INTERNAL MEDICINE

## 2025-03-18 PROCEDURE — 82043 UR ALBUMIN QUANTITATIVE: CPT | Performed by: INTERNAL MEDICINE

## 2025-03-19 ENCOUNTER — TELEPHONE (OUTPATIENT)
Dept: INTERNAL MEDICINE | Age: 75
End: 2025-03-19
Payer: MEDICARE

## 2025-03-19 NOTE — TELEPHONE ENCOUNTER
Caller: Gurdeep Alison Em    Relationship: Self    Best call back number:     [phone]     Equipment requested: NEW CPAP MACHINE    Reason for the request: CURRENT MACHINE IS OVER 5 YEARS OLD    Prescribing Provider: [facility]    Additional information or concerns: PATIENT STATES THAT [facility] CONTACTED HER AND TOLD HER THAT SHE NEEDS TO GET A NEW MACHINE BECAUSE HERS IS MORE THAN 5 YEARS OLD.     SHE STATES THAT THEY ASKED FOR THE ORDER TO BE FAXED BACK TO THEM.      FAX: [phone]

## 2025-08-29 ENCOUNTER — LAB (OUTPATIENT)
Dept: LAB | Facility: HOSPITAL | Age: 75
End: 2025-08-29
Payer: MEDICARE

## 2025-08-29 DIAGNOSIS — E78.2 MIXED HYPERLIPIDEMIA: ICD-10-CM

## 2025-08-29 DIAGNOSIS — Z12.11 COLON CANCER SCREENING: ICD-10-CM

## 2025-08-29 DIAGNOSIS — E55.9 VITAMIN D DEFICIENCY: ICD-10-CM

## 2025-08-29 DIAGNOSIS — H81.01 MENIERE DISEASE, RIGHT: ICD-10-CM

## 2025-08-29 DIAGNOSIS — M81.0 OSTEOPOROSIS, UNSPECIFIED OSTEOPOROSIS TYPE, UNSPECIFIED PATHOLOGICAL FRACTURE PRESENCE: ICD-10-CM

## 2025-08-29 DIAGNOSIS — R74.8 ELEVATED LIVER ENZYMES: ICD-10-CM

## 2025-08-29 DIAGNOSIS — R73.01 IFG (IMPAIRED FASTING GLUCOSE): ICD-10-CM

## 2025-08-29 DIAGNOSIS — G47.33 OSA ON CPAP: ICD-10-CM

## 2025-08-29 DIAGNOSIS — H93.13 TINNITUS OF BOTH EARS: ICD-10-CM

## 2025-08-29 DIAGNOSIS — G62.9 PERIPHERAL POLYNEUROPATHY: ICD-10-CM

## 2025-08-29 DIAGNOSIS — F41.1 ANXIETY, GENERALIZED: ICD-10-CM

## 2025-08-29 DIAGNOSIS — Z00.00 MEDICARE ANNUAL WELLNESS VISIT, SUBSEQUENT: ICD-10-CM

## 2025-08-29 LAB
ALBUMIN SERPL-MCNC: 3.9 G/DL (ref 3.5–5.2)
ALBUMIN/GLOB SERPL: 1.5 G/DL
ALP SERPL-CCNC: 104 U/L (ref 39–117)
ALT SERPL W P-5'-P-CCNC: 14 U/L (ref 1–33)
ANION GAP SERPL CALCULATED.3IONS-SCNC: 12.5 MMOL/L (ref 5–15)
AST SERPL-CCNC: 19 U/L (ref 1–32)
BASOPHILS # BLD AUTO: 0.04 10*3/MM3 (ref 0–0.2)
BASOPHILS NFR BLD AUTO: 0.5 % (ref 0–1.5)
BILIRUB SERPL-MCNC: 0.5 MG/DL (ref 0–1.2)
BUN SERPL-MCNC: 15 MG/DL (ref 8–23)
BUN/CREAT SERPL: 24.6 (ref 7–25)
CALCIUM SPEC-SCNC: 9.5 MG/DL (ref 8.6–10.5)
CHLORIDE SERPL-SCNC: 105 MMOL/L (ref 98–107)
CHOLEST SERPL-MCNC: 156 MG/DL (ref 0–200)
CO2 SERPL-SCNC: 24.5 MMOL/L (ref 22–29)
CREAT SERPL-MCNC: 0.61 MG/DL (ref 0.57–1)
DEPRECATED RDW RBC AUTO: 39.6 FL (ref 37–54)
EGFRCR SERPLBLD CKD-EPI 2021: 94.5 ML/MIN/1.73
EOSINOPHIL # BLD AUTO: 0.18 10*3/MM3 (ref 0–0.4)
EOSINOPHIL NFR BLD AUTO: 2.3 % (ref 0.3–6.2)
ERYTHROCYTE [DISTWIDTH] IN BLOOD BY AUTOMATED COUNT: 12.1 % (ref 12.3–15.4)
GLOBULIN UR ELPH-MCNC: 2.6 GM/DL
GLUCOSE SERPL-MCNC: 92 MG/DL (ref 65–99)
HBA1C MFR BLD: 5.6 % (ref 4.8–5.6)
HCT VFR BLD AUTO: 44.7 % (ref 34–46.6)
HDLC SERPL-MCNC: 50 MG/DL (ref 40–60)
HGB BLD-MCNC: 15.2 G/DL (ref 12–15.9)
IMM GRANULOCYTES # BLD AUTO: 0.03 10*3/MM3 (ref 0–0.05)
IMM GRANULOCYTES NFR BLD AUTO: 0.4 % (ref 0–0.5)
LDLC SERPL CALC-MCNC: 86 MG/DL (ref 0–100)
LDLC/HDLC SERPL: 1.67 {RATIO}
LYMPHOCYTES # BLD AUTO: 1.53 10*3/MM3 (ref 0.7–3.1)
LYMPHOCYTES NFR BLD AUTO: 19.5 % (ref 19.6–45.3)
MCH RBC QN AUTO: 31 PG (ref 26.6–33)
MCHC RBC AUTO-ENTMCNC: 34 G/DL (ref 31.5–35.7)
MCV RBC AUTO: 91 FL (ref 79–97)
MONOCYTES # BLD AUTO: 0.61 10*3/MM3 (ref 0.1–0.9)
MONOCYTES NFR BLD AUTO: 7.8 % (ref 5–12)
NEUTROPHILS NFR BLD AUTO: 5.47 10*3/MM3 (ref 1.7–7)
NEUTROPHILS NFR BLD AUTO: 69.5 % (ref 42.7–76)
NRBC BLD AUTO-RTO: 0 /100 WBC (ref 0–0.2)
PLATELET # BLD AUTO: 277 10*3/MM3 (ref 140–450)
PMV BLD AUTO: 11.8 FL (ref 6–12)
POTASSIUM SERPL-SCNC: 4.4 MMOL/L (ref 3.5–5.2)
PROT SERPL-MCNC: 6.5 G/DL (ref 6–8.5)
RBC # BLD AUTO: 4.91 10*6/MM3 (ref 3.77–5.28)
SODIUM SERPL-SCNC: 142 MMOL/L (ref 136–145)
TRIGL SERPL-MCNC: 113 MG/DL (ref 0–150)
VLDLC SERPL-MCNC: 20 MG/DL (ref 5–40)
WBC NRBC COR # BLD AUTO: 7.86 10*3/MM3 (ref 3.4–10.8)

## 2025-08-29 PROCEDURE — 36415 COLL VENOUS BLD VENIPUNCTURE: CPT

## 2025-08-29 PROCEDURE — 85025 COMPLETE CBC W/AUTO DIFF WBC: CPT

## 2025-08-29 PROCEDURE — 80053 COMPREHEN METABOLIC PANEL: CPT

## 2025-08-29 PROCEDURE — 83036 HEMOGLOBIN GLYCOSYLATED A1C: CPT

## 2025-08-29 PROCEDURE — 80061 LIPID PANEL: CPT
